# Patient Record
Sex: FEMALE | Race: BLACK OR AFRICAN AMERICAN | NOT HISPANIC OR LATINO | URBAN - METROPOLITAN AREA
[De-identification: names, ages, dates, MRNs, and addresses within clinical notes are randomized per-mention and may not be internally consistent; named-entity substitution may affect disease eponyms.]

---

## 2019-09-03 ENCOUNTER — INPATIENT (INPATIENT)
Facility: HOSPITAL | Age: 34
LOS: 1 days | Discharge: HOME | DRG: 812 | End: 2019-09-05
Attending: INTERNAL MEDICINE | Admitting: INTERNAL MEDICINE
Payer: COMMERCIAL

## 2019-09-03 VITALS — WEIGHT: 210.1 LBS | HEIGHT: 69 IN

## 2019-09-03 DIAGNOSIS — Z29.9 ENCOUNTER FOR PROPHYLACTIC MEASURES, UNSPECIFIED: ICD-10-CM

## 2019-09-03 DIAGNOSIS — E80.6 OTHER DISORDERS OF BILIRUBIN METABOLISM: ICD-10-CM

## 2019-09-03 DIAGNOSIS — D57.00 HB-SS DISEASE WITH CRISIS, UNSPECIFIED: ICD-10-CM

## 2019-09-03 DIAGNOSIS — D57.1 SICKLE-CELL DISEASE WITHOUT CRISIS: ICD-10-CM

## 2019-09-03 DIAGNOSIS — D64.9 ANEMIA, UNSPECIFIED: ICD-10-CM

## 2019-09-03 LAB
ALBUMIN SERPL ELPH-MCNC: 4.1 G/DL — SIGNIFICANT CHANGE UP (ref 3.5–5)
ALP SERPL-CCNC: 122 U/L — HIGH (ref 40–120)
ALT FLD-CCNC: 13 U/L DA — SIGNIFICANT CHANGE UP (ref 10–60)
ANION GAP SERPL CALC-SCNC: 11 MMOL/L — SIGNIFICANT CHANGE UP (ref 5–17)
AST SERPL-CCNC: 26 U/L — SIGNIFICANT CHANGE UP (ref 10–40)
BASOPHILS # BLD AUTO: 0.07 K/UL — SIGNIFICANT CHANGE UP (ref 0–0.2)
BASOPHILS NFR BLD AUTO: 0.4 % — SIGNIFICANT CHANGE UP (ref 0–2)
BILIRUB SERPL-MCNC: 1.9 MG/DL — HIGH (ref 0.2–1.2)
BUN SERPL-MCNC: 4 MG/DL — LOW (ref 7–18)
CALCIUM SERPL-MCNC: 9.2 MG/DL — SIGNIFICANT CHANGE UP (ref 8.4–10.5)
CHLORIDE SERPL-SCNC: 105 MMOL/L — SIGNIFICANT CHANGE UP (ref 96–108)
CO2 SERPL-SCNC: 23 MMOL/L — SIGNIFICANT CHANGE UP (ref 22–31)
CREAT SERPL-MCNC: 0.77 MG/DL — SIGNIFICANT CHANGE UP (ref 0.5–1.3)
EOSINOPHIL # BLD AUTO: 0.02 K/UL — SIGNIFICANT CHANGE UP (ref 0–0.5)
EOSINOPHIL NFR BLD AUTO: 0.1 % — SIGNIFICANT CHANGE UP (ref 0–6)
GLUCOSE SERPL-MCNC: 102 MG/DL — HIGH (ref 70–99)
HCG SERPL-ACNC: <1 MIU/ML — SIGNIFICANT CHANGE UP
HCT VFR BLD CALC: 29.3 % — LOW (ref 34.5–45)
HGB BLD-MCNC: 10.1 G/DL — LOW (ref 11.5–15.5)
IMM GRANULOCYTES NFR BLD AUTO: 0.8 % — SIGNIFICANT CHANGE UP (ref 0–1.5)
LDH SERPL L TO P-CCNC: 339 U/L — HIGH (ref 120–225)
LYMPHOCYTES # BLD AUTO: 28.1 % — SIGNIFICANT CHANGE UP (ref 13–44)
LYMPHOCYTES # BLD AUTO: 4.46 K/UL — HIGH (ref 1–3.3)
MAGNESIUM SERPL-MCNC: 2.1 MG/DL — SIGNIFICANT CHANGE UP (ref 1.6–2.6)
MCHC RBC-ENTMCNC: 30.5 PG — SIGNIFICANT CHANGE UP (ref 27–34)
MCHC RBC-ENTMCNC: 34.5 GM/DL — SIGNIFICANT CHANGE UP (ref 32–36)
MCV RBC AUTO: 88.5 FL — SIGNIFICANT CHANGE UP (ref 80–100)
MONOCYTES # BLD AUTO: 0.68 K/UL — SIGNIFICANT CHANGE UP (ref 0–0.9)
MONOCYTES NFR BLD AUTO: 4.3 % — SIGNIFICANT CHANGE UP (ref 2–14)
NEUTROPHILS # BLD AUTO: 10.51 K/UL — HIGH (ref 1.8–7.4)
NEUTROPHILS NFR BLD AUTO: 66.3 % — SIGNIFICANT CHANGE UP (ref 43–77)
NRBC # BLD: 0 /100 WBCS — SIGNIFICANT CHANGE UP (ref 0–0)
PLATELET # BLD AUTO: 541 K/UL — HIGH (ref 150–400)
POTASSIUM SERPL-MCNC: 3.9 MMOL/L — SIGNIFICANT CHANGE UP (ref 3.5–5.3)
POTASSIUM SERPL-SCNC: 3.9 MMOL/L — SIGNIFICANT CHANGE UP (ref 3.5–5.3)
PROT SERPL-MCNC: 9.2 G/DL — HIGH (ref 6–8.3)
RBC # BLD: 3.31 M/UL — LOW (ref 3.8–5.2)
RBC # BLD: 3.31 M/UL — LOW (ref 3.8–5.2)
RBC # FLD: 17 % — HIGH (ref 10.3–14.5)
RETICS #: 328 K/UL — HIGH (ref 25–125)
RETICS/RBC NFR: 9.9 % — HIGH (ref 0.5–2.5)
SODIUM SERPL-SCNC: 139 MMOL/L — SIGNIFICANT CHANGE UP (ref 135–145)
WBC # BLD: 15.87 K/UL — HIGH (ref 3.8–10.5)
WBC # FLD AUTO: 15.87 K/UL — HIGH (ref 3.8–10.5)

## 2019-09-03 PROCEDURE — 99285 EMERGENCY DEPT VISIT HI MDM: CPT

## 2019-09-03 PROCEDURE — 71046 X-RAY EXAM CHEST 2 VIEWS: CPT | Mod: 26

## 2019-09-03 PROCEDURE — 99223 1ST HOSP IP/OBS HIGH 75: CPT | Mod: AI,GC

## 2019-09-03 RX ORDER — ONDANSETRON 8 MG/1
4 TABLET, FILM COATED ORAL EVERY 6 HOURS
Refills: 0 | Status: DISCONTINUED | OUTPATIENT
Start: 2019-09-03 | End: 2019-09-05

## 2019-09-03 RX ORDER — KETOROLAC TROMETHAMINE 30 MG/ML
30 SYRINGE (ML) INJECTION ONCE
Refills: 0 | Status: DISCONTINUED | OUTPATIENT
Start: 2019-09-03 | End: 2019-09-03

## 2019-09-03 RX ORDER — HYDROMORPHONE HYDROCHLORIDE 2 MG/ML
1 INJECTION INTRAMUSCULAR; INTRAVENOUS; SUBCUTANEOUS ONCE
Refills: 0 | Status: DISCONTINUED | OUTPATIENT
Start: 2019-09-03 | End: 2019-09-03

## 2019-09-03 RX ORDER — INFLUENZA VIRUS VACCINE 15; 15; 15; 15 UG/.5ML; UG/.5ML; UG/.5ML; UG/.5ML
0.5 SUSPENSION INTRAMUSCULAR ONCE
Refills: 0 | Status: COMPLETED | OUTPATIENT
Start: 2019-09-03 | End: 2019-09-05

## 2019-09-03 RX ORDER — ACETAMINOPHEN 500 MG
1000 TABLET ORAL ONCE
Refills: 0 | Status: COMPLETED | OUTPATIENT
Start: 2019-09-04 | End: 2019-09-04

## 2019-09-03 RX ORDER — ACETAMINOPHEN 500 MG
1000 TABLET ORAL ONCE
Refills: 0 | Status: COMPLETED | OUTPATIENT
Start: 2019-09-03 | End: 2019-09-03

## 2019-09-03 RX ORDER — KETOROLAC TROMETHAMINE 30 MG/ML
30 SYRINGE (ML) INJECTION EVERY 6 HOURS
Refills: 0 | Status: DISCONTINUED | OUTPATIENT
Start: 2019-09-03 | End: 2019-09-05

## 2019-09-03 RX ORDER — SODIUM CHLORIDE 9 MG/ML
1000 INJECTION INTRAMUSCULAR; INTRAVENOUS; SUBCUTANEOUS ONCE
Refills: 0 | Status: COMPLETED | OUTPATIENT
Start: 2019-09-03 | End: 2019-09-03

## 2019-09-03 RX ORDER — DEXTROSE MONOHYDRATE, SODIUM CHLORIDE, AND POTASSIUM CHLORIDE 50; .745; 4.5 G/1000ML; G/1000ML; G/1000ML
1000 INJECTION, SOLUTION INTRAVENOUS
Refills: 0 | Status: DISCONTINUED | OUTPATIENT
Start: 2019-09-03 | End: 2019-09-05

## 2019-09-03 RX ORDER — ENOXAPARIN SODIUM 100 MG/ML
40 INJECTION SUBCUTANEOUS DAILY
Refills: 0 | Status: DISCONTINUED | OUTPATIENT
Start: 2019-09-03 | End: 2019-09-05

## 2019-09-03 RX ORDER — HYDROMORPHONE HYDROCHLORIDE 2 MG/ML
1 INJECTION INTRAMUSCULAR; INTRAVENOUS; SUBCUTANEOUS
Refills: 0 | Status: DISCONTINUED | OUTPATIENT
Start: 2019-09-03 | End: 2019-09-04

## 2019-09-03 RX ORDER — KETOROLAC TROMETHAMINE 30 MG/ML
30 SYRINGE (ML) INJECTION EVERY 6 HOURS
Refills: 0 | Status: DISCONTINUED | OUTPATIENT
Start: 2019-09-03 | End: 2019-09-03

## 2019-09-03 RX ORDER — HYDROMORPHONE HYDROCHLORIDE 2 MG/ML
2 INJECTION INTRAMUSCULAR; INTRAVENOUS; SUBCUTANEOUS EVERY 4 HOURS
Refills: 0 | Status: DISCONTINUED | OUTPATIENT
Start: 2019-09-03 | End: 2019-09-03

## 2019-09-03 RX ADMIN — HYDROMORPHONE HYDROCHLORIDE 1 MILLIGRAM(S): 2 INJECTION INTRAMUSCULAR; INTRAVENOUS; SUBCUTANEOUS at 09:55

## 2019-09-03 RX ADMIN — HYDROMORPHONE HYDROCHLORIDE 1 MILLIGRAM(S): 2 INJECTION INTRAMUSCULAR; INTRAVENOUS; SUBCUTANEOUS at 09:25

## 2019-09-03 RX ADMIN — SODIUM CHLORIDE 1000 MILLILITER(S): 9 INJECTION INTRAMUSCULAR; INTRAVENOUS; SUBCUTANEOUS at 10:50

## 2019-09-03 RX ADMIN — HYDROMORPHONE HYDROCHLORIDE 1 MILLIGRAM(S): 2 INJECTION INTRAMUSCULAR; INTRAVENOUS; SUBCUTANEOUS at 11:50

## 2019-09-03 RX ADMIN — HYDROMORPHONE HYDROCHLORIDE 1 MILLIGRAM(S): 2 INJECTION INTRAMUSCULAR; INTRAVENOUS; SUBCUTANEOUS at 20:18

## 2019-09-03 RX ADMIN — Medication 400 MILLIGRAM(S): at 19:00

## 2019-09-03 RX ADMIN — Medication 1000 MILLIGRAM(S): at 22:48

## 2019-09-03 RX ADMIN — DEXTROSE MONOHYDRATE, SODIUM CHLORIDE, AND POTASSIUM CHLORIDE 100 MILLILITER(S): 50; .745; 4.5 INJECTION, SOLUTION INTRAVENOUS at 19:00

## 2019-09-03 RX ADMIN — Medication 400 MILLIGRAM(S): at 22:33

## 2019-09-03 RX ADMIN — ONDANSETRON 4 MILLIGRAM(S): 8 TABLET, FILM COATED ORAL at 16:28

## 2019-09-03 RX ADMIN — Medication 30 MILLIGRAM(S): at 23:52

## 2019-09-03 RX ADMIN — Medication 30 MILLIGRAM(S): at 16:10

## 2019-09-03 RX ADMIN — ONDANSETRON 4 MILLIGRAM(S): 8 TABLET, FILM COATED ORAL at 22:36

## 2019-09-03 RX ADMIN — HYDROMORPHONE HYDROCHLORIDE 1 MILLIGRAM(S): 2 INJECTION INTRAMUSCULAR; INTRAVENOUS; SUBCUTANEOUS at 20:33

## 2019-09-03 RX ADMIN — Medication 30 MILLIGRAM(S): at 15:01

## 2019-09-03 RX ADMIN — Medication 400 MILLIGRAM(S): at 16:27

## 2019-09-03 RX ADMIN — SODIUM CHLORIDE 1000 MILLILITER(S): 9 INJECTION INTRAMUSCULAR; INTRAVENOUS; SUBCUTANEOUS at 09:50

## 2019-09-03 RX ADMIN — HYDROMORPHONE HYDROCHLORIDE 1 MILLIGRAM(S): 2 INJECTION INTRAMUSCULAR; INTRAVENOUS; SUBCUTANEOUS at 09:50

## 2019-09-03 RX ADMIN — SODIUM CHLORIDE 1000 MILLILITER(S): 9 INJECTION INTRAMUSCULAR; INTRAVENOUS; SUBCUTANEOUS at 11:30

## 2019-09-03 RX ADMIN — HYDROMORPHONE HYDROCHLORIDE 1 MILLIGRAM(S): 2 INJECTION INTRAMUSCULAR; INTRAVENOUS; SUBCUTANEOUS at 11:30

## 2019-09-03 RX ADMIN — Medication 30 MILLIGRAM(S): at 17:47

## 2019-09-03 RX ADMIN — Medication 1000 MILLIGRAM(S): at 19:47

## 2019-09-03 RX ADMIN — HYDROMORPHONE HYDROCHLORIDE 1 MILLIGRAM(S): 2 INJECTION INTRAMUSCULAR; INTRAVENOUS; SUBCUTANEOUS at 10:15

## 2019-09-03 NOTE — H&P ADULT - HISTORY OF PRESENT ILLNESS
33 y/o female with PMHx of sickle cell disease presents to the ED with intractable upper extremity and back pain x1 day. Patient reports waking up around 1am this morning in severe pain and says she knew it was a pain crisis immediately. She says the pain is 10/10 in severity and felt most in her upper extremities b/l and her back. She also endorses nausea. She denies any fever, cough, polyuria or any other symptoms at this time. Patient reports she moved to [place] from [place] in July 2018 and has not found a new hematologist to follow yet. She says this is why she is not on any medications at home (was previously taking iron and folic acid). She has no other complaints at this time.    ED course: s/p 1L NS bolus x2. s/p Dilaudid 1mg IV push x3 with minimal improvement in pain.

## 2019-09-03 NOTE — H&P ADULT - PROBLEM SELECTOR PLAN 5
IMPROVE VTE Individual Risk Assessment          RISK                                                          Points  [  ] Previous VTE                                                3  [  ] Thrombophilia                                             2  [  ] Lower limb paralysis                                   2        (unable to hold up >15 seconds)    [  ] Current Cancer                                             2         (within 6 months)  [ X ] Immobilization > 24 hrs                              1  [  ] ICU/CCU stay > 24 hours                             1  [  ] Age > 60                                                         1    IMPROVE VTE Score: 1    lovenox subq

## 2019-09-03 NOTE — H&P ADULT - NSHPPHYSICALEXAM_GEN_ALL_CORE
Vital Signs Last 24 Hrs  T(C): 36.7 (03 Sep 2019 15:00), Max: 36.9 (03 Sep 2019 09:27)  T(F): 98 (03 Sep 2019 15:00), Max: 98.5 (03 Sep 2019 09:27)  HR: 95 (03 Sep 2019 15:00) (95 - 98)  BP: 110/75 (03 Sep 2019 15:00) (107/76 - 110/75)  BP(mean): --  RR: 20 (03 Sep 2019 15:00) (20 - 20)  SpO2: 100% (03 Sep 2019 15:00) (100% - 100%)

## 2019-09-03 NOTE — H&P ADULT - PROBLEM SELECTOR PLAN 1
p/w severe pain in upper extremities and back x1 day  Hb 10.1 on admission with abs retic ct of 328    WBC count 15.81  pt not on any medications at home for SCD and has no hematologist at present  starting dilaudid 1mg q3 prn  starting toradol 30mg IV push q6 standing as per pain mgt  zofran prn for nausea  pain management following  heme/onc consult p/w severe pain in upper extremities and back x1 day  Hb 10.1 on admission with abs retic ct of 328    WBC count 15.81  pt not on any medications at home for SCD and has no hematologist at present  starting dilaudid 1mg q3 prn  starting toradol 30mg IV push q6 standing as per pain mgt  zofran prn for nausea  pain management following  heme/onc consult  f/u Hb electrophoresis

## 2019-09-03 NOTE — H&P ADULT - PROBLEM SELECTOR PLAN 3
IMPROVE VTE Individual Risk Assessment          RISK                                                          Points  [  ] Previous VTE                                                3  [  ] Thrombophilia                                             2  [  ] Lower limb paralysis                                   2        (unable to hold up >15 seconds)    [  ] Current Cancer                                             2         (within 6 months)  [ X ] Immobilization > 24 hrs                              1  [  ] ICU/CCU stay > 24 hours                             1  [  ] Age > 60                                                         1    IMPROVE VTE Score: 1    lovenox subq tbili on admission 1.9  pt reports known history of gallstones  denies abdominal pain at present  f/u US abdomen  f/u CMP daily tbili on admission 1.9  pt reports known history of gallstones  denies abdominal pain at present  likely 2/2 to hemolysis  f/u CMP daily

## 2019-09-03 NOTE — ED PROVIDER NOTE - PROGRESS NOTE DETAILS
pt reassessed, pain improved though not resolved.  CXR negative.  H/h noted.  elevated retic count.  Pt currently receiving 2nd liter of fluids.  Will continue to observe. pt again reassessed.  Still not feeling well.  pt has received 3 doses of parenteral narcotics and pain continues.  Will admit for pain control.

## 2019-09-03 NOTE — H&P ADULT - PROBLEM SELECTOR PLAN 2
management as above  pain control prn  will need outpatient heme/onc referral for continued care on discharge

## 2019-09-03 NOTE — H&P ADULT - ATTENDING COMMENTS
Patient was examined and discussed with Dr. Nolasco.   She has a history of sickle cell disease with infrequent crises.  [place] and has not yet established care here.  Today she has c/o of pain radiating from both elbows to her neck (from her neck to her arms and both elbows??)    allergies:  erythromycin    Alert cooperative woman with intermittent severe pain  Vital Signs Last 24 Hrs  T(C): 36.3 (03 Sep 2019 18:35), Max: 36.9 (03 Sep 2019 09:27)  T(F): 97.3 (03 Sep 2019 18:35), Max: 98.5 (03 Sep 2019 09:27)  HR: 69 (03 Sep 2019 18:35) (69 - 98)  BP: 143/70 (03 Sep 2019 18:35) (107/76 - 143/70)  BP(mean): --  RR: 18 (03 Sep 2019 18:35) (18 - 20)  SpO2: 100% (03 Sep 2019 18:35) (100% - 100%)  No point tenderness on the neck  Lungs, clear  Cor, RRR  abdomen, soft  Neurological, intact                        10.1   15.87 )-----------( 541      ( 03 Sep 2019 09:36 )             29.3   09-03    139  |  105  |  4<L>  ----------------------------<  102<H>  3.9   |  23  |  0.77    Ca    9.2      03 Sep 2019 09:36  Mg     2.1     09-03    TPro  9.2<H>  /  Alb  4.1  /  TBili  1.9<H>  /  DBili  x   /  AST  26  /  ALT  13  /  AlkPhos  122<H>  09-03  Reticulocyte Percent: 9.9 % (09.03.19 @ 09:36)    IMP:  Sickle cell pain crisis  Plan:  IV hydration, analgesia:  IV Tylenol x 1-2.  Ketorolac every six hours; dilaudid PRN.   Hemoglobin electrophoresis in AM.  Hematology consulation, Dr. Ribeiro will see tomorrow.

## 2019-09-03 NOTE — ED PROVIDER NOTE - OBJECTIVE STATEMENT
35 y/o F patient with a significant PMHx of Sickle Cell anemia and no significant PSHx presents to the ED with elbow and back pain. Patient says her last sickle cell crisis was x5 years ago with her presenting symptoms feeling typical of her past crisis. Patient denies chest pain, SOB, nausea, vomiting, and any other complaints. NKDA.

## 2019-09-03 NOTE — ED ADULT NURSE NOTE - OBJECTIVE STATEMENT
Pt AOx4, ambulatory, c/o sickle cell crisis, BL shoulder and arms pain. 10/10. pt denies n/v/f., CP, SOB. Pt medicated for pain as per MD Tate orders.

## 2019-09-03 NOTE — H&P ADULT - PROBLEM SELECTOR PLAN 4
IMPROVE VTE Individual Risk Assessment          RISK                                                          Points  [  ] Previous VTE                                                3  [  ] Thrombophilia                                             2  [  ] Lower limb paralysis                                   2        (unable to hold up >15 seconds)    [  ] Current Cancer                                             2         (within 6 months)  [ X ] Immobilization > 24 hrs                              1  [  ] ICU/CCU stay > 24 hours                             1  [  ] Age > 60                                                         1    IMPROVE VTE Score: 1    lovenox subq likely 2/2 to SCD  baseline unknown  f/u CBC daily  f/u iron studies  f/u haptoglobin

## 2019-09-03 NOTE — H&P ADULT - NSICDXFAMILYHX_GEN_ALL_CORE_FT
No pertinent family history in first degree relatives FAMILY HISTORY:  Family history of sickle cell disease

## 2019-09-04 VITALS
TEMPERATURE: 98 F | SYSTOLIC BLOOD PRESSURE: 154 MMHG | OXYGEN SATURATION: 100 % | DIASTOLIC BLOOD PRESSURE: 55 MMHG | RESPIRATION RATE: 15 BRPM | HEART RATE: 72 BPM

## 2019-09-04 DIAGNOSIS — D57.00 HB-SS DISEASE WITH CRISIS, UNSPECIFIED: ICD-10-CM

## 2019-09-04 LAB
ALBUMIN SERPL ELPH-MCNC: 3.5 G/DL — SIGNIFICANT CHANGE UP (ref 3.5–5)
ALP SERPL-CCNC: 104 U/L — SIGNIFICANT CHANGE UP (ref 40–120)
ALT FLD-CCNC: 14 U/L DA — SIGNIFICANT CHANGE UP (ref 10–60)
AMPHET UR-MCNC: NEGATIVE — SIGNIFICANT CHANGE UP
ANION GAP SERPL CALC-SCNC: 5 MMOL/L — SIGNIFICANT CHANGE UP (ref 5–17)
AST SERPL-CCNC: 18 U/L — SIGNIFICANT CHANGE UP (ref 10–40)
BARBITURATES UR SCN-MCNC: NEGATIVE — SIGNIFICANT CHANGE UP
BASOPHILS # BLD AUTO: 0.05 K/UL — SIGNIFICANT CHANGE UP (ref 0–0.2)
BASOPHILS NFR BLD AUTO: 0.4 % — SIGNIFICANT CHANGE UP (ref 0–2)
BENZODIAZ UR-MCNC: NEGATIVE — SIGNIFICANT CHANGE UP
BILIRUB SERPL-MCNC: 1.4 MG/DL — HIGH (ref 0.2–1.2)
BUN SERPL-MCNC: 4 MG/DL — LOW (ref 7–18)
CALCIUM SERPL-MCNC: 8.6 MG/DL — SIGNIFICANT CHANGE UP (ref 8.4–10.5)
CHLORIDE SERPL-SCNC: 107 MMOL/L — SIGNIFICANT CHANGE UP (ref 96–108)
CHOLEST SERPL-MCNC: 138 MG/DL — SIGNIFICANT CHANGE UP (ref 10–199)
CO2 SERPL-SCNC: 29 MMOL/L — SIGNIFICANT CHANGE UP (ref 22–31)
COCAINE METAB.OTHER UR-MCNC: NEGATIVE — SIGNIFICANT CHANGE UP
CREAT SERPL-MCNC: 0.66 MG/DL — SIGNIFICANT CHANGE UP (ref 0.5–1.3)
EOSINOPHIL # BLD AUTO: 0.19 K/UL — SIGNIFICANT CHANGE UP (ref 0–0.5)
EOSINOPHIL NFR BLD AUTO: 1.4 % — SIGNIFICANT CHANGE UP (ref 0–6)
ETHANOL SERPL-MCNC: <3 MG/DL — SIGNIFICANT CHANGE UP (ref 0–10)
FERRITIN SERPL-MCNC: 322 NG/ML — HIGH (ref 15–150)
FOLATE SERPL-MCNC: 9.2 NG/ML — SIGNIFICANT CHANGE UP
GLUCOSE SERPL-MCNC: 95 MG/DL — SIGNIFICANT CHANGE UP (ref 70–99)
HBA1C BLD-MCNC: <4 % — LOW (ref 4–5.6)
HCT VFR BLD CALC: 26.1 % — LOW (ref 34.5–45)
HDLC SERPL-MCNC: 43 MG/DL — LOW
HGB BLD-MCNC: 8.8 G/DL — LOW (ref 11.5–15.5)
IMM GRANULOCYTES NFR BLD AUTO: 0.8 % — SIGNIFICANT CHANGE UP (ref 0–1.5)
IRON SATN MFR SERPL: 16 % — SIGNIFICANT CHANGE UP (ref 15–50)
IRON SATN MFR SERPL: 39 UG/DL — LOW (ref 40–150)
LIPID PNL WITH DIRECT LDL SERPL: 77 MG/DL — SIGNIFICANT CHANGE UP
LYMPHOCYTES # BLD AUTO: 39.5 % — SIGNIFICANT CHANGE UP (ref 13–44)
LYMPHOCYTES # BLD AUTO: 5.24 K/UL — HIGH (ref 1–3.3)
MAGNESIUM SERPL-MCNC: 2.2 MG/DL — SIGNIFICANT CHANGE UP (ref 1.6–2.6)
MCHC RBC-ENTMCNC: 30.2 PG — SIGNIFICANT CHANGE UP (ref 27–34)
MCHC RBC-ENTMCNC: 33.7 GM/DL — SIGNIFICANT CHANGE UP (ref 32–36)
MCV RBC AUTO: 89.7 FL — SIGNIFICANT CHANGE UP (ref 80–100)
METHADONE UR-MCNC: NEGATIVE — SIGNIFICANT CHANGE UP
MONOCYTES # BLD AUTO: 1.05 K/UL — HIGH (ref 0–0.9)
MONOCYTES NFR BLD AUTO: 7.9 % — SIGNIFICANT CHANGE UP (ref 2–14)
NEUTROPHILS # BLD AUTO: 6.63 K/UL — SIGNIFICANT CHANGE UP (ref 1.8–7.4)
NEUTROPHILS NFR BLD AUTO: 50 % — SIGNIFICANT CHANGE UP (ref 43–77)
NRBC # BLD: 0 /100 WBCS — SIGNIFICANT CHANGE UP (ref 0–0)
OPIATES UR-MCNC: NEGATIVE — SIGNIFICANT CHANGE UP
PCP SPEC-MCNC: SIGNIFICANT CHANGE UP
PCP UR-MCNC: NEGATIVE — SIGNIFICANT CHANGE UP
PHOSPHATE SERPL-MCNC: 4.1 MG/DL — SIGNIFICANT CHANGE UP (ref 2.5–4.5)
PLATELET # BLD AUTO: 458 K/UL — HIGH (ref 150–400)
POTASSIUM SERPL-MCNC: 4.1 MMOL/L — SIGNIFICANT CHANGE UP (ref 3.5–5.3)
POTASSIUM SERPL-SCNC: 4.1 MMOL/L — SIGNIFICANT CHANGE UP (ref 3.5–5.3)
PROT SERPL-MCNC: 7.7 G/DL — SIGNIFICANT CHANGE UP (ref 6–8.3)
RBC # BLD: 2.91 M/UL — LOW (ref 3.8–5.2)
RBC # FLD: 17 % — HIGH (ref 10.3–14.5)
SODIUM SERPL-SCNC: 141 MMOL/L — SIGNIFICANT CHANGE UP (ref 135–145)
THC UR QL: NEGATIVE — SIGNIFICANT CHANGE UP
TIBC SERPL-MCNC: 251 UG/DL — SIGNIFICANT CHANGE UP (ref 250–450)
TOTAL CHOLESTEROL/HDL RATIO MEASUREMENT: 3.2 RATIO — LOW (ref 3.3–7.1)
TRANSFERRIN SERPL-MCNC: 193 MG/DL — LOW (ref 200–360)
TRIGL SERPL-MCNC: 91 MG/DL — SIGNIFICANT CHANGE UP (ref 10–149)
TSH SERPL-MCNC: 4.47 UU/ML — SIGNIFICANT CHANGE UP (ref 0.34–4.82)
UIBC SERPL-MCNC: 212 UG/DL — SIGNIFICANT CHANGE UP (ref 110–370)
VIT B12 SERPL-MCNC: 457 PG/ML — SIGNIFICANT CHANGE UP (ref 232–1245)
WBC # BLD: 13.26 K/UL — HIGH (ref 3.8–10.5)
WBC # FLD AUTO: 13.26 K/UL — HIGH (ref 3.8–10.5)

## 2019-09-04 PROCEDURE — 99233 SBSQ HOSP IP/OBS HIGH 50: CPT | Mod: GC

## 2019-09-04 PROCEDURE — 83020 HEMOGLOBIN ELECTROPHORESIS: CPT | Mod: 26,59

## 2019-09-04 PROCEDURE — 99223 1ST HOSP IP/OBS HIGH 75: CPT

## 2019-09-04 RX ORDER — ACETAMINOPHEN 500 MG
1000 TABLET ORAL ONCE
Refills: 0 | Status: COMPLETED | OUTPATIENT
Start: 2019-09-04 | End: 2019-09-04

## 2019-09-04 RX ORDER — CYCLOBENZAPRINE HYDROCHLORIDE 10 MG/1
10 TABLET, FILM COATED ORAL THREE TIMES A DAY
Refills: 0 | Status: DISCONTINUED | OUTPATIENT
Start: 2019-09-04 | End: 2019-09-04

## 2019-09-04 RX ORDER — CYCLOBENZAPRINE HYDROCHLORIDE 10 MG/1
10 TABLET, FILM COATED ORAL THREE TIMES A DAY
Refills: 0 | Status: DISCONTINUED | OUTPATIENT
Start: 2019-09-04 | End: 2019-09-05

## 2019-09-04 RX ORDER — ACETAMINOPHEN 500 MG
1000 TABLET ORAL ONCE
Refills: 0 | Status: COMPLETED | OUTPATIENT
Start: 2019-09-05 | End: 2019-09-05

## 2019-09-04 RX ORDER — FOLIC ACID 0.8 MG
1 TABLET ORAL DAILY
Refills: 0 | Status: DISCONTINUED | OUTPATIENT
Start: 2019-09-04 | End: 2019-09-05

## 2019-09-04 RX ORDER — METOCLOPRAMIDE HCL 10 MG
10 TABLET ORAL EVERY 6 HOURS
Refills: 0 | Status: DISCONTINUED | OUTPATIENT
Start: 2019-09-04 | End: 2019-09-05

## 2019-09-04 RX ORDER — SENNA PLUS 8.6 MG/1
2 TABLET ORAL AT BEDTIME
Refills: 0 | Status: DISCONTINUED | OUTPATIENT
Start: 2019-09-04 | End: 2019-09-05

## 2019-09-04 RX ADMIN — ONDANSETRON 4 MILLIGRAM(S): 8 TABLET, FILM COATED ORAL at 06:17

## 2019-09-04 RX ADMIN — CYCLOBENZAPRINE HYDROCHLORIDE 10 MILLIGRAM(S): 10 TABLET, FILM COATED ORAL at 16:25

## 2019-09-04 RX ADMIN — Medication 30 MILLIGRAM(S): at 16:32

## 2019-09-04 RX ADMIN — Medication 30 MILLIGRAM(S): at 22:31

## 2019-09-04 RX ADMIN — Medication 1000 MILLIGRAM(S): at 22:45

## 2019-09-04 RX ADMIN — Medication 400 MILLIGRAM(S): at 06:16

## 2019-09-04 RX ADMIN — CYCLOBENZAPRINE HYDROCHLORIDE 10 MILLIGRAM(S): 10 TABLET, FILM COATED ORAL at 22:27

## 2019-09-04 RX ADMIN — HYDROMORPHONE HYDROCHLORIDE 1 MILLIGRAM(S): 2 INJECTION INTRAMUSCULAR; INTRAVENOUS; SUBCUTANEOUS at 03:51

## 2019-09-04 RX ADMIN — Medication 1000 MILLIGRAM(S): at 15:00

## 2019-09-04 RX ADMIN — Medication 400 MILLIGRAM(S): at 13:27

## 2019-09-04 RX ADMIN — HYDROMORPHONE HYDROCHLORIDE 1 MILLIGRAM(S): 2 INJECTION INTRAMUSCULAR; INTRAVENOUS; SUBCUTANEOUS at 03:36

## 2019-09-04 RX ADMIN — Medication 30 MILLIGRAM(S): at 18:07

## 2019-09-04 RX ADMIN — DEXTROSE MONOHYDRATE, SODIUM CHLORIDE, AND POTASSIUM CHLORIDE 100 MILLILITER(S): 50; .745; 4.5 INJECTION, SOLUTION INTRAVENOUS at 06:16

## 2019-09-04 RX ADMIN — Medication 104 MILLIGRAM(S): at 19:53

## 2019-09-04 RX ADMIN — Medication 1000 MILLIGRAM(S): at 06:33

## 2019-09-04 RX ADMIN — Medication 30 MILLIGRAM(S): at 22:45

## 2019-09-04 RX ADMIN — Medication 1 MILLIGRAM(S): at 12:25

## 2019-09-04 RX ADMIN — DEXTROSE MONOHYDRATE, SODIUM CHLORIDE, AND POTASSIUM CHLORIDE 100 MILLILITER(S): 50; .745; 4.5 INJECTION, SOLUTION INTRAVENOUS at 16:45

## 2019-09-04 RX ADMIN — Medication 30 MILLIGRAM(S): at 13:32

## 2019-09-04 RX ADMIN — Medication 400 MILLIGRAM(S): at 22:27

## 2019-09-04 RX ADMIN — Medication 30 MILLIGRAM(S): at 00:07

## 2019-09-04 RX ADMIN — Medication 30 MILLIGRAM(S): at 06:16

## 2019-09-04 RX ADMIN — Medication 30 MILLIGRAM(S): at 12:19

## 2019-09-04 RX ADMIN — Medication 30 MILLIGRAM(S): at 06:33

## 2019-09-04 NOTE — PROGRESS NOTE ADULT - PROBLEM SELECTOR PLAN 2
management as above  pain control prn  will need outpatient heme/onc referral for continued care on discharge management as above  pain control prn  will arrange outpatient heme/onc referral for continued care on discharge

## 2019-09-04 NOTE — PROGRESS NOTE ADULT - PROBLEM SELECTOR PLAN 3
tbili on admission 1.9  pt reports known history of gallstones  denies abdominal pain at present  likely 2/2 to hemolysis  f/u CMP daily tbili on admission 1.9  pt reports known history of gallstones  denies abdominal pain at present  likely 2/2 to hemolysis

## 2019-09-04 NOTE — CONSULT NOTE ADULT - SUBJECTIVE AND OBJECTIVE BOX
Patient is a 34y old  Female who presents with a chief complaint of sickle cell pain crisis (03 Sep 2019 15:07)      HPI:  35 y/o female with PMHx of sickle cell disease presents to the ED with intractable upper extremity and back pain x1 day. Patient reports waking up around 1am this morning in severe pain and says she knew it was a pain crisis immediately. She says the pain is 10/10 in severity and felt most in her upper extremities b/l and her back. She also endorses nausea. She denies any fever, cough, polyuria or any other symptoms at this time. Patient reports she moved to [place] from [place] in July 2018 and has not found a new hematologist to follow yet. She says this is why she is not on any medications at home (was previously taking iron and folic acid). She has no other complaints at this time. She reports two previous admissions for crisis in 2011 and 2002 in part related to her high HbF level. Was never prescribed Hydrea. Transfused twice during her two pregnancies. Never intubated. Took penicillin as a child for prophyslaxis. Dilaudid does not agree with her. Responds better to iv acetominophen.    ED course: s/p 1L NS bolus x2. s/p Dilaudid 1mg IV push x3 with minimal improvement in pain. (03 Sep 2019 15:07)       ROS:  Negative except for:    PAST MEDICAL & SURGICAL HISTORY:  Sickle cell disease  No significant past surgical history      SOCIAL HISTORY:    FAMILY HISTORY:  Family history of sickle cell disease      MEDICATIONS  (STANDING):  dextrose 5% + sodium chloride 0.45% with potassium chloride 20 mEq/L 1000 milliLiter(s) (100 mL/Hr) IV Continuous <Continuous>  enoxaparin Injectable 40 milliGRAM(s) SubCutaneous daily  influenza   Vaccine 0.5 milliLiter(s) IntraMuscular once  ketorolac   Injectable 30 milliGRAM(s) IV Push every 6 hours    MEDICATIONS  (PRN):  HYDROmorphone  Injectable 1 milliGRAM(s) IV Push every 3 hours PRN Severe Pain (7 - 10)  ondansetron Injectable 4 milliGRAM(s) IV Push every 6 hours PRN Nausea and/or Vomiting      Allergies    erythromycin (Rash)    Intolerances        Vital Signs Last 24 Hrs  T(C): 36.6 (04 Sep 2019 07:37), Max: 36.9 (03 Sep 2019 09:27)  T(F): 97.9 (04 Sep 2019 07:37), Max: 98.5 (03 Sep 2019 09:27)  HR: 59 (04 Sep 2019 07:37) (50 - 98)  BP: 133/60 (04 Sep 2019 07:37) (107/76 - 143/70)  BP(mean): --  RR: 17 (04 Sep 2019 07:37) (15 - 20)  SpO2: 100% (04 Sep 2019 07:37) (99% - 100%)    PHYSICAL EXAM  General: adult in NAD  HEENT: clear oropharynx, anicteric sclera, pink conjunctiva  Neck: supple  CV: normal S1/S2 with no murmur rubs or gallops  Lungs: positive air movement b/l ant lungs,clear to auscultation, no wheezes, no rales  Abdomen: soft non-tender non-distended, no hepatosplenomegaly  Ext: no clubbing cyanosis or edema  Skin: no rashes and no petechiae  Neuro: alert and oriented X 4, no focal deficits      LABS:                          8.8    13.26 )-----------( 458      ( 04 Sep 2019 07:23 )             26.1         Mean Cell Volume : 89.7 fl  Mean Cell Hemoglobin : 30.2 pg  Mean Cell Hemoglobin Concentration : 33.7 gm/dL  Auto Neutrophil # : 6.63 K/uL  Auto Lymphocyte # : 5.24 K/uL  Auto Monocyte # : 1.05 K/uL  Auto Eosinophil # : 0.19 K/uL  Auto Basophil # : 0.05 K/uL  Auto Neutrophil % : 50.0 %  Auto Lymphocyte % : 39.5 %  Auto Monocyte % : 7.9 %  Auto Eosinophil % : 1.4 %  Auto Basophil % : 0.4 %      Serial CBC's  09-04 @ 07:23  Hct-26.1 / Hgb-8.8 / Plat-458 / RBC-2.91 / WBC-13.26  Serial CBC's  09-03 @ 09:36  Hct-29.3 / Hgb-10.1 / Plat-541 / RBC-3.31 / WBC-15.87      09-04    141  |  107  |  x   ----------------------------<  x   4.1   |  29  |  x     Ca    9.2      03 Sep 2019 09:36  Mg     2.2     09-04    TPro  x   /  Alb  3.5  /  TBili  x   /  DBili  x   /  AST  x   /  ALT  x   /  AlkPhos  x   09-04          Iron - Total Binding Capacity.: 251 ug/dL (09-04 @ 07:23)  Reticulocyte Percent: 9.9 % (09-03 @ 09:36)              BLOOD SMEAR INTERPRETATION:       RADIOLOGY & ADDITIONAL STUDIES:

## 2019-09-04 NOTE — PROGRESS NOTE ADULT - ATTENDING COMMENTS
Patient was examined and discussed with Dr. Chance, Pain Management, and RN at the bedside during an episodic cramping neck pain radiating down her arms.    She has responded well to analgesics, but has early breakthrough.   BP cuff initiated the most recent spasm.   We will continue current plan of IV acetaminophen, ketorolac every six hours and muscle relaxant and try reglan PRN spasm.     Hematology consultation, appreciated.

## 2019-09-04 NOTE — DISCHARGE NOTE PROVIDER - NSDCCPCAREPLAN_GEN_ALL_CORE_FT
PRINCIPAL DISCHARGE DIAGNOSIS  Diagnosis: Sickle cell crisis  Assessment and Plan of Treatment: You came to us with Sickle cell crisis. we gave you fluids and started pain managment. Next day you felt much better. Heamatologist recommended for you to go to Sickle cell clinic in [facility]. We arranged an appointment for you to go there. Please followup wuth your PCP ivan 1 week of discharge. Avoid any triger of your sickle cell crisis.      SECONDARY DISCHARGE DIAGNOSES  Diagnosis: Hyperbilirubinemia  Assessment and Plan of Treatment: You came to us with increased LFT. We monitored your LFTs. You have a history of gallstones too. Please followup with your PCP thiago 1 week of discharge.    Diagnosis: Sickle cell disease  Assessment and Plan of Treatment: You have a history of sickle cell disease. Please followup with sickle cell clinic at [facility].    Diagnosis: Anemia  Assessment and Plan of Treatment: You PRINCIPAL DISCHARGE DIAGNOSIS  Diagnosis: Sickle cell crisis  Assessment and Plan of Treatment: You were diagnosed  with Sickle cell crisis.   You were treated with IV fluids and pain medications .Your pain is much better controlled now  . Your latest Hemoglobin is 9.4 . You didnt required any blood transfusion while in the hospital.   Continue taking the pain medications as prescribed .   Hematologist recommended for you to go to Sickle cell center at [facility]. [facility] will call you regarding setting up an appointment or call at [phone] if you dont hear from them in 2-3 days Please followup with your PCP ivan 1 week of discharge. Avoid any triger of your sickle cell crisis.      SECONDARY DISCHARGE DIAGNOSES  Diagnosis: Hyperbilirubinemia  Assessment and Plan of Treatment: You came to us with increased Liver enzymes . We monitored your LFTs. You have a history of gallstones too. Please followup with your PCP thiago 1 week of discharge.    Diagnosis: Sickle cell disease  Assessment and Plan of Treatment: You have a history of sickle cell disease.   Continue taking the folic acid as prescribed . Please followup with sickle cell clinic at [place].

## 2019-09-04 NOTE — CONSULT NOTE ADULT - SUBJECTIVE AND OBJECTIVE BOX
Chief Complaint:    HPI:  33 y/o female with PMHx of sickle cell disease presents to the ED with intractable upper extremity and back pain x1 day. Patient reports waking up around 1am this morning in severe pain and says she knew it was a pain crisis immediately. She says the pain is 10/10 in severity and felt most in her upper extremities b/l and her back. She also endorses nausea. She denies any fever, cough, polyuria or any other symptoms at this time. Patient reports she moved to [place] from [place] in July 2018 and has not found a new hematologist to follow yet. She says this is why she is not on any medications at home (was previously taking iron and folic acid). She has no other complaints at this time.    ED course: s/p 1L NS bolus x2. s/p Dilaudid 1mg IV push x3 with minimal improvement in pain. (03 Sep 2019 15:07)      Pain Level:   Scale: VAS    PAST MEDICAL & SURGICAL HISTORY:  Sickle cell disease  No significant past surgical history      FAMILY HISTORY:  Family history of sickle cell disease      SOCIAL HISTORY:  [ ] Denies Smoking, Alcohol, or Drug Use    Allergies    erythromycin (Rash)    Intolerances        PAIN MEDICATIONS:  acetaminophen  IVPB .. 1000 milliGRAM(s) IV Intermittent once  cyclobenzaprine 10 milliGRAM(s) Oral three times a day  ketorolac   Injectable 30 milliGRAM(s) IV Push every 6 hours  ondansetron Injectable 4 milliGRAM(s) IV Push every 6 hours PRN      Heme:  enoxaparin Injectable 40 milliGRAM(s) SubCutaneous daily    Antibiotics:    Cardiovascular:    GI:  senna 2 Tablet(s) Oral at bedtime    Endocrine:    All Other Medications:  dextrose 5% + sodium chloride 0.45% with potassium chloride 20 mEq/L 1000 milliLiter(s) IV Continuous <Continuous>  folic acid 1 milliGRAM(s) Oral daily  influenza   Vaccine 0.5 milliLiter(s) IntraMuscular once      REVIEW OF SYSTEMS:    CONSTITUTIONAL: No fever, weight loss, or fatigue  RESPIRATORY: No cough, wheezing, chills, or hemoptysis, No SOB  NECK: No neck pain  CARDIOVASCULAR: No chest pain, palpitations, dizziness, or leg swelling  GASTROINTESTINAL: No abdominal or epigastric pain.  No nausea, vomiting, or hematemesis.  No diarrhea. + constipation.  GENITOURINARY: No dysuria, frequency, hematuria or incontinence  NEUROLOGICAL: No headaches, memory loss, loss of strength, numbness or tremors  MUSCULOSKELETAL: No joint pain or swelling, No muscle or back pain    Vital Signs Last 24 Hrs  T(C): 36.6 (04 Sep 2019 07:37), Max: 36.7 (03 Sep 2019 15:00)  T(F): 97.9 (04 Sep 2019 07:37), Max: 98.1 (04 Sep 2019 00:10)  HR: 59 (04 Sep 2019 07:37) (50 - 95)  BP: 133/60 (04 Sep 2019 07:37) (110/75 - 143/70)  BP(mean): --  RR: 17 (04 Sep 2019 07:37) (15 - 20)  SpO2: 100% (04 Sep 2019 07:37) (99% - 100%)    PAIN SCORE:         SCALE USED: (1-10 VNRS)    PHYSICAL EXAM:    GENERAL: NAD, well-groomed, well-developed   NERVOUS SYSTEM: Alert and oriented x3, Motor strength 5/5 B/L upper and lower extremities, SLR -   CHEST/LUNG: Clear to percussion bilaterally, no rale, rhonchi or wheezing  HEART: Regular rate and rhythm, No murmurs, rubs, or gallops   ABDOMEN: Soft, nontender, nondistended, Bowel sounds present  BACK: No CVA tenderness, No paravetebral tenderness  EXTREMITIES: +2 periperal extremities, no edema, cyanosis + decreased rom     LABS:                          8.8    13.26 )-----------( 458      ( 04 Sep 2019 07:23 )             26.1     09-04    141  |  107  |  4<L>  ----------------------------<  95  4.1   |  29  |  0.66    Ca    8.6      04 Sep 2019 07:23  Phos  4.1     09-04  Mg     2.2     09-04    TPro  7.7  /  Alb  3.5  /  TBili  1.4<H>  /  DBili  x   /  AST  18  /  ALT  14  /  AlkPhos  104  09-04          RADIOLOGY:    Drug Screen:        RECOMMENDATIONS    [ ]  [place]  Reviewed and Copied to Chart Chief Complaint: sickle cell crisis    HPI:  35 y/o female with PMHx of sickle cell disease presents to the ED with intractable upper extremity and back pain x1 day. Patient reports waking up around 1am this morning in severe pain and says she knew it was a pain crisis immediately. She says the pain is 10/10 in severity and felt most in her upper extremities b/l and her back. She also endorses nausea. She denies any fever, cough, polyuria or any other symptoms at this time. Patient reports she moved to [place] from [place] in July 2018 and has not found a new hematologist to follow yet. She says this is why she is not on any medications at home (was previously taking iron and folic acid). She has no other complaints at this time.    34 year old female with complaints of upper extremity pain. + sharp, spasms bilateral arms.  + upper back pain.  Pt with history of sickle cell disease. Last crisis - 2012.  Pt does not take opioids as outpt.  Pt also feels that opioids makes her feel drowsy and "out of it".            PAST MEDICAL & SURGICAL HISTORY:  Sickle cell disease  No significant past surgical history      FAMILY HISTORY:  Family history of sickle cell disease      SOCIAL HISTORY:  [x ] Denies Smoking, Alcohol, or Drug Use    Allergies    erythromycin (Rash)    Intolerances        PAIN MEDICATIONS:  acetaminophen  IVPB .. 1000 milliGRAM(s) IV Intermittent once  cyclobenzaprine 10 milliGRAM(s) Oral three times a day  ketorolac   Injectable 30 milliGRAM(s) IV Push every 6 hours  ondansetron Injectable 4 milliGRAM(s) IV Push every 6 hours PRN      Heme:  enoxaparin Injectable 40 milliGRAM(s) SubCutaneous daily    Antibiotics:    Cardiovascular:    GI:  senna 2 Tablet(s) Oral at bedtime    Endocrine:    All Other Medications:  dextrose 5% + sodium chloride 0.45% with potassium chloride 20 mEq/L 1000 milliLiter(s) IV Continuous <Continuous>  folic acid 1 milliGRAM(s) Oral daily  influenza   Vaccine 0.5 milliLiter(s) IntraMuscular once      REVIEW OF SYSTEMS:    CONSTITUTIONAL: No fever, weight loss, or fatigue  CARDIOVASCULAR: No chest pain, palpitations, dizziness, or leg swelling  GASTROINTESTINAL: No abdominal or epigastric pain.  No nausea, vomiting, or hematemesis.  No diarrhea. + constipation.  GENITOURINARY: No dysuria, frequency, hematuria or incontinence  NEUROLOGICAL: No headaches, memory loss, loss of strength, numbness or tremors  MUSCULOSKELETAL: + bilateral upper extremity pain, + upper back pain    Vital Signs Last 24 Hrs  T(C): 36.6 (04 Sep 2019 07:37), Max: 36.7 (03 Sep 2019 15:00)  T(F): 97.9 (04 Sep 2019 07:37), Max: 98.1 (04 Sep 2019 00:10)  HR: 59 (04 Sep 2019 07:37) (50 - 95)  BP: 133/60 (04 Sep 2019 07:37) (110/75 - 143/70)  BP(mean): --  RR: 17 (04 Sep 2019 07:37) (15 - 20)  SpO2: 100% (04 Sep 2019 07:37) (99% - 100%)    PAIN SCORE:  5/10       SCALE USED: (1-10 VNRS)    PHYSICAL EXAM:    GENERAL: NAD  NERVOUS SYSTEM: Alert and oriented x3,   CHEST/LUNG: Clear to percussion bilaterally, no rale, rhonchi or wheezing  HEART: Regular rate and rhythm, No murmurs, rubs, or gallops   ABDOMEN: Soft, nontender, nondistended, Bowel sounds present  BACK: No CVA tenderness, No paravertebral tenderness  EXTREMITIES: +2 peripheral extremities, no edema, cyanosis + decreased rom + upper arm spasms and sharp pain    LABS:                          8.8    13.26 )-----------( 458      ( 04 Sep 2019 07:23 )             26.1     09-04    141  |  107  |  4<L>  ----------------------------<  95  4.1   |  29  |  0.66    Ca    8.6      04 Sep 2019 07:23  Phos  4.1     09-04  Mg     2.2     09-04    TPro  7.7  /  Alb  3.5  /  TBili  1.4<H>  /  DBili  x   /  AST  18  /  ALT  14  /  AlkPhos  104  09-04          RADIOLOGY:  < from: Xray Chest 2 Views PA/Lat (09.03.19 @ 10:22) >    EXAM:  XR CHEST PA LAT 2V                            PROCEDURE DATE:  09/03/2019          INTERPRETATION:  PROCEDURE: PA and lateral views of the chest.    CLINICAL INFORMATION: Chest pain.    COMPARISON: None.    FINDINGS:    Lungs: The lungs are clear.  Heart: The heart is top normal in size.  Mediastinum: The mediastinum is within normal limits.    IMPRESSION:    Clear lungs.    < end of copied text >    Drug Screen:        RECOMMENDATIONS    [ ]  [place]  Reviewed and Copied to Chart

## 2019-09-04 NOTE — DISCHARGE NOTE PROVIDER - HOSPITAL COURSE
33 y/o female with PMHx of sickle cell disease presents to the ED with intractable upper extremity and back pain x1 day. Patient reports waking up around 1am this morning in severe pain and says she knew it was a pain crisis immediately. She says the pain is 10/10 in severity and felt most in her upper extremities b/l and her back. She also endorses nausea. She denies any fever, cough, polyuria or any other symptoms at this time. Patient reports she moved to [place] from [place] in July 2018 and has not found a new hematologist to follow yet. She says this is why she is not on any medications at home (was previously taking iron and folic acid). She has no other complaints at this time.        ED course: s/p 1L NS bolus x2. s/p Dilaudid 1mg IV push x3 with minimal improvement in pain.     Sickle cell pain crisis.  Plan: p/w severe pain in upper extremities and back x1 day    Hb 10.1 on admission with abs retic ct of 328        WBC count 15.81    pt not on any medications at home for SCD and has no hematologist at present     toradol 30mg IV push q6 standing as per pain mgt    zofran prn for nausea    flexeril q8 for muscle spasm    heme/onc consult :    Would continue with iv acetaminophen and toradol as patient has many CNS side effects with Dilaudid     folic acid 1 mg po daily    f/u Hb electrophoresis.     Sickle cell disease.  Plan: management as above    pain control prn    will arrange outpatient heme/onc referral for continued care on discharge.     Hyperbilirubinemia.  Plan: tbili on admission 1.9    pt reports known history of gallstones    denies abdominal pain at present    likely 2/2 to hemolysis    f/u CMP daily.     Anemia.  Plan: likely 2/2 to SCD    baseline unknown    f/u CBC daily    f/u iron studies    f/u haptoglobin. 33 y/o female with PMHx of sickle cell disease presents to the ED with intractable upper extremity and back pain x1 day. Patient reports waking up around 1am this morning in severe pain and says she knew it was a pain crisis immediately. She says the pain is 10/10 in severity and felt most in her upper extremities b/l and her back. She also endorses nausea. She denies any fever, cough, polyuria or any other symptoms at this time. Patient reports she moved to [place] from [place] in July 2018 and has not found a new hematologist to follow yet. She says this is why she is not on any medications at home (was previously taking iron and folic acid). She has no other complaints at this time.        ED course: s/p 1L NS bolus x2. s/p Dilaudid 1mg IV push x3 with minimal improvement in pain.     Sickle cell pain crisis.  Plan: p/w severe pain in upper extremities and back x1 day    Hb 10.1 on admission with abs retic ct of 328,,WBC count 15.81    pt not on any medications at home for SCD and has no hematologist at present     toradol, zofran, flexeril q8 for muscle spasm    will arrange outpatient heme/onc referral for continued care on discharge.     Hyperbilirubinemia.  Plan: tbili on admission 1.9    pt reports known history of gallstones.        PT is stable and ready to discharge. 35 y/o female with PMHx of sickle cell disease presents to the ED with intractable upper extremity and back pain x1 day. Patient reports waking up around 1am this morning in severe pain and says she knew it was a pain crisis immediately. She says the pain is 10/10 in severity and felt most in her upper extremities b/l and her back. She also endorses nausea. She denies any fever, cough, polyuria or any other symptoms at this time. Patient reports she moved to [place] from [place] in July 2018 and has not found a new hematologist to follow yet. She says this is why she is not on any medications at home (was previously taking iron and folic acid). She has no other complaints at this time.        ED course: s/p 1L NS bolus x2. s/p Dilaudid 1mg IV push x3 with minimal improvement in pain.     Sickle cell pain crisis.  Plan: p/w severe pain in upper extremities and back x1 day    Hb 10.1 on admission with abs retic ct of 328,,WBC count 15.81    pt not on any medications at home for SCD and has no hematologist at present     toradol, zofran, flexeril q8 for muscle spasm    Pain is better controlled now with toradol.     will arrange outpatient heme/onc referral for continued care on discharge.     Hyperbilirubinemia.  Plan: tbili on admission 1.9    pt reports known history of gallstones.        PT is stable and ready to discharge.

## 2019-09-04 NOTE — PROGRESS NOTE ADULT - PROBLEM SELECTOR PLAN 1
p/w severe pain in upper extremities and back x1 day  Hb 10.1 on admission with abs retic ct of 328    WBC count 15.81  pt not on any medications at home for SCD and has no hematologist at present  startED dilaudid 1mg q3 prn  started toradol 30mg IV push q6 standing as per pain mgt  zofran prn for nausea  pain management following  heme/onc consult :  Would continue with iv acetaminophen and toradol as patient has many CNS side effects with Dilaudid  -Would add folic acid 1 mg po daily and on discharge  -Check retic count and LDH    f/u Hb electrophoresis p/w severe pain in upper extremities and back x1 day  Hb 10.1 on admission with abs retic ct of 328    WBC count 15.81  pt not on any medications at home for SCD and has no hematologist at present   toradol 30mg IV push q6 standing as per pain mgt  zofran prn for nausea  flexeril q8 for muscle spasm  heme/onc consult :  Would continue with iv acetaminophen and toradol as patient has many CNS side effects with Dilaudid   folic acid 1 mg po daily  f/u Hb electrophoresis

## 2019-09-04 NOTE — PROGRESS NOTE ADULT - SUBJECTIVE AND OBJECTIVE BOX
PGY1 Note discussed with Supervising Resident and Primary Attending.    Patient is a 34y old  Female who presents with a chief complaint of sickle cell pain crisis (04 Sep 2019 08:27)      INTERVAL HPI/OVERNIGHT EVENTS :    ***********************************************************************************************************    MEDICATIONS  (STANDING):  dextrose 5% + sodium chloride 0.45% with potassium chloride 20 mEq/L 1000 milliLiter(s) (100 mL/Hr) IV Continuous <Continuous>  enoxaparin Injectable 40 milliGRAM(s) SubCutaneous daily  folic acid 1 milliGRAM(s) Oral daily  influenza   Vaccine 0.5 milliLiter(s) IntraMuscular once  ketorolac   Injectable 30 milliGRAM(s) IV Push every 6 hours    MEDICATIONS  (PRN):  cyclobenzaprine 10 milliGRAM(s) Oral three times a day PRN Muscle Spasm  ondansetron Injectable 4 milliGRAM(s) IV Push every 6 hours PRN Nausea and/or Vomiting      ***********************************************************************************************************    Allergies    erythromycin (Rash)    Intolerances        ***********************************************************************************************************    REVIEW OF SYSTEMS :  * CONSTITUTIONAL      :ALL OVER GERNALIZED FATIGUE, No Fever, Weight loss,   * EYES                             : No eye pain , Visual disturbances or Discharge  * RESPIRATORY             : No Cough, Wheezing, Chills or Hemoptysis; No shortness of breath  * CARDIOVASCULAR     : No Chest pain, Palpitations, Dizziness, or Leg swelling  * GASTROINTESTINAL  : No Abdominal or Epigastric pain. No Nausea, Vomiting or Hematemesis; No Diarrhea or Constipation. No Melena or Hematochezia.  * GENITOURINARY        : No Dysuria , Frequency , Haematuria   * NEUROLOGICAL          : No Headaches, Memory loss, Loss of trength, Numbness, or Tremors  * MUSCULOSKELETAL   : No Joint pain  * PSYCHIATRY                 : No Depression or Anxiety   * HEME/LYMPH              : No Easy Bruising or Bleeding gums  * SKIN                               : No Itching, Burning, Rashes, or Lesions     ***********************************************************************************************************    Vital Signs Last 24 Hrs  T(C): 36.6 (04 Sep 2019 07:37), Max: 36.7 (03 Sep 2019 15:00)  T(F): 97.9 (04 Sep 2019 07:37), Max: 98.1 (04 Sep 2019 00:10)  HR: 59 (04 Sep 2019 07:37) (50 - 95)  BP: 133/60 (04 Sep 2019 07:37) (110/75 - 143/70)  BP(mean): --  RR: 17 (04 Sep 2019 07:37) (15 - 20)  SpO2: 100% (04 Sep 2019 07:37) (99% - 100%)    ***********************************************************************************************************    PHYSICAL EXAM :  * GENERAL                 : NAD, Well-groomed, Well-developed, GENERLIZED PAIN  * HEAD                       :  Atraumatic, Normocephalic  * EYES                         : EOMI, PERRLA, Conjunctiva and Sclera clear  * ENT                           : Moist Mucous Membranes  * NECK                         : Supple, No JVD, Normal Thyroid  * CHEST/LUNG           : Clear to Auscultation bilaterally; No Rales, Rhonchi, Wheezing or Rubs  * HEART                       : Regular Rate and Rhythm; No murmurs, Rubs or gallops  * ABDOMEN                : Soft, Non-tender, Non-distended; Bowel Sounds present  * NERVOUS SYSTEM  :  Alert & Oriented X3, Good Concentration; Motor Strength 5/5 B/L UL LL ; DTRs 2+ Intact and Symmetric  * EXTREMITIES            :  2+ Peripheral Pulses, No clubbing, cyanosis, or edema  * SKIN                           : No Rashes or Lesions    **********************************************************************************************************  LABS:                          8.8    13.26 )-----------( 458      ( 04 Sep 2019 07:23 )             26.1     09-04    141  |  107  |  4<L>  ----------------------------<  95  4.1   |  29  |  0.66    Ca    8.6      04 Sep 2019 07:23  Phos  4.1     09-04  Mg     2.2     09-04    TPro  7.7  /  Alb  3.5  /  TBili  1.4<H>  /  DBili  x   /  AST  18  /  ALT  14  /  AlkPhos  104  09-04        CAPILLARY BLOOD GLUCOSE          **********************************************************************************************************    RADIOLOGY & ADDITIONAL TESTS:   No radiological imaging was required    Imaging Personally Reviewed   :  [ ] YES      Consultant(s) Notes Reviewed :  [ ] YES

## 2019-09-04 NOTE — CONSULT NOTE ADULT - ASSESSMENT
35 y/o female with PMHx of sickle cell disease presents to the ED with intractable upper extremity and back pain x1 day. Patient reports waking up around 1am this morning in severe pain and says she knew it was a pain crisis immediately. She says the pain is 10/10 in severity and felt most in her upper extremities b/l and her back. She also endorses nausea. She denies any fever, cough, polyuria or any other symptoms at this time. Patient reports she moved to [place] from [place] in July 2018 and has not found a new hematologist to follow yet. She says this is why she is not on any medications at home (was previously taking iron and folic acid). She has no other complaints at this time. She reports two previous admissions for crisis in 2011 and 2002 in part related to her high HbF level. Was never prescribed Hydrea. Transfused twice during her two pregnancies. Never intubated. Took penicillin as a child for prophyslaxis. Dilaudid does not agree with her. Responds better to iv acetominophen.    ED course: s/p 1L NS bolus x2. s/p Dilaudid 1mg IV push x3 with minimal improvement in pain. (03 Sep 2019 15:07)     Problem # 1 Sickle Cell Disease / Crisis  -Would continue with iv acetaminophen and toradol as patient has many CNS side effects with Dilaudid  -Would add folic acid 1 mg po daily and on discharge  -Check retic count and LDH  -Tx Hgb < 6 or symptomatic  -Recommended to patient to make appointment with [facility] where investigative treatments are available.    Thank you. Will follow For questions call [phone]. DISPLAY PLAN FREE TEXT

## 2019-09-04 NOTE — PROGRESS NOTE ADULT - PROBLEM SELECTOR PLAN 4
likely 2/2 to SCD  baseline unknown  f/u CBC daily  f/u iron studies  f/u haptoglobin likely 2/2 to SCD  baseline unknown  CBC: hb 8.8    iron studies transferin 193, ferritin 322, TIBC wnl  f/u haptoglobin

## 2019-09-05 LAB
ALBUMIN SERPL ELPH-MCNC: 3.6 G/DL — SIGNIFICANT CHANGE UP (ref 3.5–5)
ALP SERPL-CCNC: 119 U/L — SIGNIFICANT CHANGE UP (ref 40–120)
ALT FLD-CCNC: 14 U/L DA — SIGNIFICANT CHANGE UP (ref 10–60)
ANION GAP SERPL CALC-SCNC: 7 MMOL/L — SIGNIFICANT CHANGE UP (ref 5–17)
AST SERPL-CCNC: 22 U/L — SIGNIFICANT CHANGE UP (ref 10–40)
BASOPHILS # BLD AUTO: 0.05 K/UL — SIGNIFICANT CHANGE UP (ref 0–0.2)
BASOPHILS NFR BLD AUTO: 0.3 % — SIGNIFICANT CHANGE UP (ref 0–2)
BILIRUB SERPL-MCNC: 1.4 MG/DL — HIGH (ref 0.2–1.2)
BUN SERPL-MCNC: 3 MG/DL — LOW (ref 7–18)
CALCIUM SERPL-MCNC: 8.8 MG/DL — SIGNIFICANT CHANGE UP (ref 8.4–10.5)
CHLORIDE SERPL-SCNC: 104 MMOL/L — SIGNIFICANT CHANGE UP (ref 96–108)
CO2 SERPL-SCNC: 28 MMOL/L — SIGNIFICANT CHANGE UP (ref 22–31)
CREAT SERPL-MCNC: 0.53 MG/DL — SIGNIFICANT CHANGE UP (ref 0.5–1.3)
EOSINOPHIL # BLD AUTO: 0.12 K/UL — SIGNIFICANT CHANGE UP (ref 0–0.5)
EOSINOPHIL NFR BLD AUTO: 0.8 % — SIGNIFICANT CHANGE UP (ref 0–6)
GLUCOSE SERPL-MCNC: 107 MG/DL — HIGH (ref 70–99)
HAPTOGLOB SERPL-MCNC: <20 MG/DL — LOW (ref 34–200)
HCT VFR BLD CALC: 27.2 % — LOW (ref 34.5–45)
HGB BLD-MCNC: 9.4 G/DL — LOW (ref 11.5–15.5)
HGB S BLD QL: POSITIVE
HGB S BLD QL: POSITIVE
IMM GRANULOCYTES NFR BLD AUTO: 0.6 % — SIGNIFICANT CHANGE UP (ref 0–1.5)
LDH SERPL L TO P-CCNC: 314 U/L — HIGH (ref 120–225)
LYMPHOCYTES # BLD AUTO: 19.7 % — SIGNIFICANT CHANGE UP (ref 13–44)
LYMPHOCYTES # BLD AUTO: 3.03 K/UL — SIGNIFICANT CHANGE UP (ref 1–3.3)
MAGNESIUM SERPL-MCNC: 2.1 MG/DL — SIGNIFICANT CHANGE UP (ref 1.6–2.6)
MCHC RBC-ENTMCNC: 30.6 PG — SIGNIFICANT CHANGE UP (ref 27–34)
MCHC RBC-ENTMCNC: 34.6 GM/DL — SIGNIFICANT CHANGE UP (ref 32–36)
MCV RBC AUTO: 88.6 FL — SIGNIFICANT CHANGE UP (ref 80–100)
MONOCYTES # BLD AUTO: 1.06 K/UL — HIGH (ref 0–0.9)
MONOCYTES NFR BLD AUTO: 6.9 % — SIGNIFICANT CHANGE UP (ref 2–14)
NEUTROPHILS # BLD AUTO: 11.01 K/UL — HIGH (ref 1.8–7.4)
NEUTROPHILS NFR BLD AUTO: 71.7 % — SIGNIFICANT CHANGE UP (ref 43–77)
NRBC # BLD: 0 /100 WBCS — SIGNIFICANT CHANGE UP (ref 0–0)
PHOSPHATE SERPL-MCNC: 3.8 MG/DL — SIGNIFICANT CHANGE UP (ref 2.5–4.5)
PLATELET # BLD AUTO: 449 K/UL — HIGH (ref 150–400)
POTASSIUM SERPL-MCNC: 3.6 MMOL/L — SIGNIFICANT CHANGE UP (ref 3.5–5.3)
POTASSIUM SERPL-SCNC: 3.6 MMOL/L — SIGNIFICANT CHANGE UP (ref 3.5–5.3)
PROT SERPL-MCNC: 8 G/DL — SIGNIFICANT CHANGE UP (ref 6–8.3)
RBC # BLD: 3.07 M/UL — LOW (ref 3.8–5.2)
RBC # BLD: 3.07 M/UL — LOW (ref 3.8–5.2)
RBC # FLD: 16.5 % — HIGH (ref 10.3–14.5)
RETICS #: 278.4 K/UL — HIGH (ref 25–125)
RETICS/RBC NFR: 9.1 % — HIGH (ref 0.5–2.5)
SODIUM SERPL-SCNC: 139 MMOL/L — SIGNIFICANT CHANGE UP (ref 135–145)
SOLUBILITY: POSITIVE
SOLUBILITY: POSITIVE
WBC # BLD: 15.36 K/UL — HIGH (ref 3.8–10.5)
WBC # FLD AUTO: 15.36 K/UL — HIGH (ref 3.8–10.5)

## 2019-09-05 PROCEDURE — 83540 ASSAY OF IRON: CPT

## 2019-09-05 PROCEDURE — 85027 COMPLETE CBC AUTOMATED: CPT

## 2019-09-05 PROCEDURE — 82746 ASSAY OF FOLIC ACID SERUM: CPT

## 2019-09-05 PROCEDURE — 99285 EMERGENCY DEPT VISIT HI MDM: CPT | Mod: 25

## 2019-09-05 PROCEDURE — 99233 SBSQ HOSP IP/OBS HIGH 50: CPT

## 2019-09-05 PROCEDURE — 83020 HEMOGLOBIN ELECTROPHORESIS: CPT

## 2019-09-05 PROCEDURE — 36415 COLL VENOUS BLD VENIPUNCTURE: CPT

## 2019-09-05 PROCEDURE — 84100 ASSAY OF PHOSPHORUS: CPT

## 2019-09-05 PROCEDURE — 82607 VITAMIN B-12: CPT

## 2019-09-05 PROCEDURE — 85045 AUTOMATED RETICULOCYTE COUNT: CPT

## 2019-09-05 PROCEDURE — 80061 LIPID PANEL: CPT

## 2019-09-05 PROCEDURE — 83010 ASSAY OF HAPTOGLOBIN QUANT: CPT

## 2019-09-05 PROCEDURE — 84466 ASSAY OF TRANSFERRIN: CPT

## 2019-09-05 PROCEDURE — 80307 DRUG TEST PRSMV CHEM ANLYZR: CPT

## 2019-09-05 PROCEDURE — 83036 HEMOGLOBIN GLYCOSYLATED A1C: CPT

## 2019-09-05 PROCEDURE — 71046 X-RAY EXAM CHEST 2 VIEWS: CPT

## 2019-09-05 PROCEDURE — 82728 ASSAY OF FERRITIN: CPT

## 2019-09-05 PROCEDURE — 84702 CHORIONIC GONADOTROPIN TEST: CPT

## 2019-09-05 PROCEDURE — 83615 LACTATE (LD) (LDH) ENZYME: CPT

## 2019-09-05 PROCEDURE — 83550 IRON BINDING TEST: CPT

## 2019-09-05 PROCEDURE — 84443 ASSAY THYROID STIM HORMONE: CPT

## 2019-09-05 PROCEDURE — 80053 COMPREHEN METABOLIC PANEL: CPT

## 2019-09-05 PROCEDURE — 83735 ASSAY OF MAGNESIUM: CPT

## 2019-09-05 PROCEDURE — 90686 IIV4 VACC NO PRSV 0.5 ML IM: CPT

## 2019-09-05 RX ORDER — FOLIC ACID 0.8 MG
1 TABLET ORAL
Qty: 30 | Refills: 0
Start: 2019-09-05 | End: 2019-10-04

## 2019-09-05 RX ORDER — KETOROLAC TROMETHAMINE 30 MG/ML
1 SYRINGE (ML) INJECTION
Qty: 9 | Refills: 0
Start: 2019-09-05 | End: 2019-09-07

## 2019-09-05 RX ORDER — CYCLOBENZAPRINE HYDROCHLORIDE 10 MG/1
1 TABLET, FILM COATED ORAL
Qty: 21 | Refills: 0
Start: 2019-09-05 | End: 2019-09-11

## 2019-09-05 RX ORDER — SENNA PLUS 8.6 MG/1
2 TABLET ORAL
Qty: 60 | Refills: 0
Start: 2019-09-05 | End: 2019-10-04

## 2019-09-05 RX ADMIN — Medication 30 MILLIGRAM(S): at 11:30

## 2019-09-05 RX ADMIN — CYCLOBENZAPRINE HYDROCHLORIDE 10 MILLIGRAM(S): 10 TABLET, FILM COATED ORAL at 06:01

## 2019-09-05 RX ADMIN — Medication 1000 MILLIGRAM(S): at 04:45

## 2019-09-05 RX ADMIN — Medication 400 MILLIGRAM(S): at 11:30

## 2019-09-05 RX ADMIN — Medication 400 MILLIGRAM(S): at 04:15

## 2019-09-05 RX ADMIN — Medication 104 MILLIGRAM(S): at 04:15

## 2019-09-05 RX ADMIN — Medication 1000 MILLIGRAM(S): at 11:53

## 2019-09-05 RX ADMIN — DEXTROSE MONOHYDRATE, SODIUM CHLORIDE, AND POTASSIUM CHLORIDE 100 MILLILITER(S): 50; .745; 4.5 INJECTION, SOLUTION INTRAVENOUS at 04:15

## 2019-09-05 RX ADMIN — CYCLOBENZAPRINE HYDROCHLORIDE 10 MILLIGRAM(S): 10 TABLET, FILM COATED ORAL at 13:47

## 2019-09-05 RX ADMIN — Medication 1 MILLIGRAM(S): at 11:29

## 2019-09-05 RX ADMIN — Medication 30 MILLIGRAM(S): at 06:20

## 2019-09-05 RX ADMIN — Medication 30 MILLIGRAM(S): at 06:01

## 2019-09-05 RX ADMIN — INFLUENZA VIRUS VACCINE 0.5 MILLILITER(S): 15; 15; 15; 15 SUSPENSION INTRAMUSCULAR at 11:34

## 2019-09-05 RX ADMIN — Medication 30 MILLIGRAM(S): at 11:53

## 2019-09-05 NOTE — PROGRESS NOTE ADULT - SUBJECTIVE AND OBJECTIVE BOX
Patient is a 34y old  Female who presents with a chief complaint of sickle cell pain crisis (04 Sep 2019 14:46)       Pt is seen and examined  pt is awake and lying in bed  pt seems better with pain level at three           ROS:  Negative except for:    MEDICATIONS  (STANDING):  cyclobenzaprine 10 milliGRAM(s) Oral three times a day  dextrose 5% + sodium chloride 0.45% with potassium chloride 20 mEq/L 1000 milliLiter(s) (100 mL/Hr) IV Continuous <Continuous>  enoxaparin Injectable 40 milliGRAM(s) SubCutaneous daily  folic acid 1 milliGRAM(s) Oral daily  ketorolac   Injectable 30 milliGRAM(s) IV Push every 6 hours  senna 2 Tablet(s) Oral at bedtime    MEDICATIONS  (PRN):  metoclopramide  IVPB 10 milliGRAM(s) IV Intermittent every 6 hours PRN muscle spasm  ondansetron Injectable 4 milliGRAM(s) IV Push every 6 hours PRN Nausea and/or Vomiting      Allergies    erythromycin (Rash)    Intolerances        Vital Signs Last 24 Hrs  T(C): 36.9 (04 Sep 2019 23:56), Max: 36.9 (04 Sep 2019 23:56)  T(F): 98.5 (04 Sep 2019 23:56), Max: 98.5 (04 Sep 2019 23:56)  HR: 72 (04 Sep 2019 23:56) (72 - 90)  BP: 154/55 (04 Sep 2019 23:56) (138/86 - 154/55)  BP(mean): --  RR: 15 (04 Sep 2019 23:56) (15 - 16)  SpO2: 100% (04 Sep 2019 23:56) (96% - 100%)    PHYSICAL EXAM  General: adult in NAD  HEENT: clear oropharynx, anicteric sclera, pink conjunctiva  Neck: supple  CV: normal S1/S2 with no murmur rubs or gallops  Lungs: positive air movement b/l ant lungs,clear to auscultation, no wheezes, no rales  Abdomen: soft non-tender non-distended, no hepatosplenomegaly  Ext: no clubbing cyanosis or edema  Skin: no rashes and no petechiae  Neuro: alert and oriented X 4, no focal deficits  LABS:                          9.4    15.36 )-----------( 449      ( 05 Sep 2019 06:29 )             27.2         Mean Cell Volume : 88.6 fl  Mean Cell Hemoglobin : 30.6 pg  Mean Cell Hemoglobin Concentration : 34.6 gm/dL  Auto Neutrophil # : 11.01 K/uL  Auto Lymphocyte # : 3.03 K/uL  Auto Monocyte # : 1.06 K/uL  Auto Eosinophil # : 0.12 K/uL  Auto Basophil # : 0.05 K/uL  Auto Neutrophil % : 71.7 %  Auto Lymphocyte % : 19.7 %  Auto Monocyte % : 6.9 %  Auto Eosinophil % : 0.8 %  Auto Basophil % : 0.3 %    Serial CBC's  09-05 @ 06:29  Hct-27.2 / Hgb-9.4 / Plat-449 / RBC-3.07 / WBC-15.36          Serial CBC's  09-04 @ 07:23  Hct-26.1 / Hgb-8.8 / Plat-458 / RBC-2.91 / WBC-13.26          Serial CBC's  09-03 @ 09:36  Hct-29.3 / Hgb-10.1 / Plat-541 / RBC-3.31 / WBC-15.87            09-05    139  |  104  |  3<L>  ----------------------------<  107<H>  3.6   |  28  |  0.53    Ca    8.8      05 Sep 2019 06:29  Phos  3.8     09-05  Mg     2.1     09-05    TPro  8.0  /  Alb  3.6  /  TBili  1.4<H>  /  DBili  x   /  AST  22  /  ALT  14  /  AlkPhos  119  09-05          Reticulocyte Percent: 9.1 % (09-05-19 @ 06:29)  WBC Count: 15.36 K/uL (09-05-19 @ 06:29)  Hemoglobin: 9.4 g/dL (09-05-19 @ 06:29)  Hematocrit: 27.2 % (09-05-19 @ 06:29)  Platelet Count - Automated: 449 K/uL (09-05-19 @ 06:29)  Folate, Serum: 9.2 ng/mL (09-04-19 @ 10:28)  Vitamin B12, Serum: 457 pg/mL (09-04-19 @ 10:28)  Ferritin, Serum: 322 ng/mL (09-04-19 @ 10:28)  WBC Count: 13.26 K/uL (09-04-19 @ 07:23)  Hemoglobin: 8.8 g/dL (09-04-19 @ 07:23)  Hematocrit: 26.1 % (09-04-19 @ 07:23)  Platelet Count - Automated: 458 K/uL (09-04-19 @ 07:23)  Iron - Total Binding Capacity.: 251 ug/dL (09-04-19 @ 07:23)  Hemoglobin: 10.1 g/dL (09-03-19 @ 09:36)  Platelet Count - Automated: 541 K/uL (09-03-19 @ 09:36)  WBC Count: 15.87 K/uL (09-03-19 @ 09:36)  Hematocrit: 29.3 % (09-03-19 @ 09:36)  Reticulocyte Percent: 9.9 % (09-03-19 @ 09:36)                BLOOD SMEAR INTERPRETATION:       RADIOLOGY & ADDITIONAL STUDIES:

## 2019-09-05 NOTE — PROGRESS NOTE ADULT - PROBLEM SELECTOR PLAN 1
- pt significantly better today  - pt was placed on toradol atc, iv tylenol for 4 doses and flexeril atc  - pt also given reglan  - dc home today on toradol 10mg po q 8 for 3 days and flexeril prn  - reglan as per primary team

## 2019-09-05 NOTE — PROGRESS NOTE ADULT - ASSESSMENT
33 y/o female with PMHx of sickle cell disease presents to the ED with intractable upper extremity and back pain x1 day. Patient reports waking up around 1am this morning in severe pain and says she knew it was a pain crisis immediately. She says the pain is 10/10 in severity and felt most in her upper extremities b/l and her back. She also endorses nausea. She denies any fever, cough, polyuria or any other symptoms at this time. Patient reports she moved to [place] from [place] in July 2018 and has not found a new hematologist to follow yet. She says this is why she is not on any medications at home (was previously taking iron and folic acid). She has no other complaints at this time. She reports two previous admissions for crisis in 2011 and 2002 in part related to her high HbF level. Was never prescribed Hydrea. Transfused twice during her two pregnancies. Never intubated. Took penicillin as a child for prophyslaxis. Dilaudid does not agree with her. Responds better to iv acetominophen.    ED course: s/p 1L NS bolus x2. s/p Dilaudid 1mg IV push x3 with minimal improvement in pain. (03 Sep 2019 15:07)     Problem # 1 Sickle Cell Disease / Crisis  -Overall improved and nearing pain level appropriate for discharge  -Would continue with iv acetaminophen and toradol as patient has many CNS side effects with Dilaudid  -Pain team consult noted  -Would add folic acid 1 mg po daily and on discharge  -retic count elevated appropriately at 9.1 and Lactate Dehydrogenase, Serum: 314 U/L (09.05.19 @ 06:29)  -Patient made inquiries to undergo evaluation at [facility]    -Tx Hgb < 6 or symptomatic  -Recommended to patient to make appointment with [facility] cell [facility] where investigative treatments are available.    Thank you. Will follow For questions call [phone].

## 2019-09-05 NOTE — DISCHARGE NOTE NURSING/CASE MANAGEMENT/SOCIAL WORK - PATIENT PORTAL LINK FT
You can access the FollowMyHealth Patient Portal offered by [facility] by registering at the following website: [email or web address]. By joining [employer]’s FollowMyHealth portal, you will also be able to view your health information using other applications (apps) compatible with our system.

## 2019-09-09 LAB
HEMOGLOBIN INTERPRETATION: SIGNIFICANT CHANGE UP
HEMOGLOBIN INTERPRETATION: SIGNIFICANT CHANGE UP
HGB A MFR BLD: 0 % — LOW (ref 95.8–98)
HGB A MFR BLD: 0 % — LOW (ref 95.8–98)
HGB A2 MFR BLD: 2.5 % — SIGNIFICANT CHANGE UP (ref 2–3.2)
HGB A2 MFR BLD: 2.6 % — SIGNIFICANT CHANGE UP (ref 2–3.2)
HGB F MFR BLD: 18.2 % — HIGH (ref 0–1)
HGB F MFR BLD: 18.3 % — HIGH (ref 0–1)
HGB S MFR BLD: 79.2 % — HIGH
HGB S MFR BLD: 79.2 % — HIGH

## 2020-03-21 NOTE — H&P ADULT - NSICDXPASTSURGICALHX_GEN_ALL_CORE_FT
2 RN Skin Check    2 RN skin check complete.   Devices in place: NA.  Skin assessed under devices: N\A.  Confirmed pressure ulcers found on: NA.  New potential pressure ulcers noted on NA. Wound consult placed N/A.  The following interventions in place Pillows.    Skin clean dry intact.  Dry flaky on feet and small scab on L shin. No open wounds     
Pt verbalized understanding of discharge instructions.  Pt refused to have his wilson bag changed to a leg bag, was given a leg back to use at home.  Pt was discharged with wilson catheter in place. All belongings incluiding pt own walker was brought down to the front of the building with pt in wheelchair and assisted into personal vehicle with girlfriend.    
Report received from MACK Rodriguez. Pt denies needs at this time. Safety precautions in place. Call light within reach.    
Telemetry Shift Summary     Rhythm AFIB  HR Range   Ectopy R PVC R coup R trig  Measurements na/0.14/na       Normal Values  Rhythm SR  HR Range    Measurements 0.12-0.20 / 0.06-0.10  / 0.30-0.52    Ongoing care. No acute issues over night. Patient did have much rest, but had no acute c/o anything. Patient just couldn't get comfortable and wanted to talk. Sher draining well. Continuing monitoring and abx as ordered.   
Telemetry Shift Summary     Rhythm Afib  HR Range -/0.16/-  Ectopy FPVCs  Measurements -/0.16/-           Normal Values  Rhythm SR  HR Range    Measurements 0.12-0.20 / 0.06-0.10  / 0.30-0.52    
PAST SURGICAL HISTORY:  No significant past surgical history

## 2023-11-19 ENCOUNTER — INPATIENT (INPATIENT)
Facility: HOSPITAL | Age: 38
LOS: 1 days | Discharge: HOME | DRG: 812 | End: 2023-11-21
Attending: STUDENT IN AN ORGANIZED HEALTH CARE EDUCATION/TRAINING PROGRAM | Admitting: STUDENT IN AN ORGANIZED HEALTH CARE EDUCATION/TRAINING PROGRAM
Payer: COMMERCIAL

## 2023-11-19 VITALS
TEMPERATURE: 99 F | HEIGHT: 69 IN | RESPIRATION RATE: 17 BRPM | DIASTOLIC BLOOD PRESSURE: 67 MMHG | WEIGHT: 237 LBS | SYSTOLIC BLOOD PRESSURE: 102 MMHG | OXYGEN SATURATION: 97 % | HEART RATE: 86 BPM

## 2023-11-19 DIAGNOSIS — D57.00 HB-SS DISEASE WITH CRISIS, UNSPECIFIED: ICD-10-CM

## 2023-11-19 LAB
ALBUMIN SERPL ELPH-MCNC: 4.5 G/DL — SIGNIFICANT CHANGE UP (ref 3.3–5)
ALBUMIN SERPL ELPH-MCNC: 4.5 G/DL — SIGNIFICANT CHANGE UP (ref 3.3–5)
ALP SERPL-CCNC: 117 U/L — SIGNIFICANT CHANGE UP (ref 40–120)
ALP SERPL-CCNC: 117 U/L — SIGNIFICANT CHANGE UP (ref 40–120)
ALT FLD-CCNC: 13 U/L — SIGNIFICANT CHANGE UP (ref 10–45)
ALT FLD-CCNC: 13 U/L — SIGNIFICANT CHANGE UP (ref 10–45)
ANION GAP SERPL CALC-SCNC: 15 MMOL/L — SIGNIFICANT CHANGE UP (ref 5–17)
ANION GAP SERPL CALC-SCNC: 15 MMOL/L — SIGNIFICANT CHANGE UP (ref 5–17)
APPEARANCE UR: CLEAR — SIGNIFICANT CHANGE UP
APPEARANCE UR: CLEAR — SIGNIFICANT CHANGE UP
AST SERPL-CCNC: 28 U/L — SIGNIFICANT CHANGE UP (ref 10–40)
AST SERPL-CCNC: 28 U/L — SIGNIFICANT CHANGE UP (ref 10–40)
BACTERIA # UR AUTO: ABNORMAL /HPF
BACTERIA # UR AUTO: ABNORMAL /HPF
BASOPHILS # BLD AUTO: 0.07 K/UL — SIGNIFICANT CHANGE UP (ref 0–0.2)
BASOPHILS # BLD AUTO: 0.07 K/UL — SIGNIFICANT CHANGE UP (ref 0–0.2)
BASOPHILS NFR BLD AUTO: 0.4 % — SIGNIFICANT CHANGE UP (ref 0–2)
BASOPHILS NFR BLD AUTO: 0.4 % — SIGNIFICANT CHANGE UP (ref 0–2)
BILIRUB SERPL-MCNC: 2 MG/DL — HIGH (ref 0.2–1.2)
BILIRUB SERPL-MCNC: 2 MG/DL — HIGH (ref 0.2–1.2)
BILIRUB UR-MCNC: NEGATIVE — SIGNIFICANT CHANGE UP
BILIRUB UR-MCNC: NEGATIVE — SIGNIFICANT CHANGE UP
BUN SERPL-MCNC: 10 MG/DL — SIGNIFICANT CHANGE UP (ref 7–23)
BUN SERPL-MCNC: 10 MG/DL — SIGNIFICANT CHANGE UP (ref 7–23)
CALCIUM SERPL-MCNC: 9.3 MG/DL — SIGNIFICANT CHANGE UP (ref 8.4–10.5)
CALCIUM SERPL-MCNC: 9.3 MG/DL — SIGNIFICANT CHANGE UP (ref 8.4–10.5)
CAST: 1 /LPF — SIGNIFICANT CHANGE UP (ref 0–4)
CAST: 1 /LPF — SIGNIFICANT CHANGE UP (ref 0–4)
CHLORIDE SERPL-SCNC: 102 MMOL/L — SIGNIFICANT CHANGE UP (ref 96–108)
CHLORIDE SERPL-SCNC: 102 MMOL/L — SIGNIFICANT CHANGE UP (ref 96–108)
CO2 SERPL-SCNC: 22 MMOL/L — SIGNIFICANT CHANGE UP (ref 22–31)
CO2 SERPL-SCNC: 22 MMOL/L — SIGNIFICANT CHANGE UP (ref 22–31)
COLOR SPEC: YELLOW — SIGNIFICANT CHANGE UP
COLOR SPEC: YELLOW — SIGNIFICANT CHANGE UP
CREAT SERPL-MCNC: 0.63 MG/DL — SIGNIFICANT CHANGE UP (ref 0.5–1.3)
CREAT SERPL-MCNC: 0.63 MG/DL — SIGNIFICANT CHANGE UP (ref 0.5–1.3)
DIFF PNL FLD: NEGATIVE — SIGNIFICANT CHANGE UP
DIFF PNL FLD: NEGATIVE — SIGNIFICANT CHANGE UP
EGFR: 115 ML/MIN/1.73M2 — SIGNIFICANT CHANGE UP
EGFR: 115 ML/MIN/1.73M2 — SIGNIFICANT CHANGE UP
EOSINOPHIL # BLD AUTO: 0.01 K/UL — SIGNIFICANT CHANGE UP (ref 0–0.5)
EOSINOPHIL # BLD AUTO: 0.01 K/UL — SIGNIFICANT CHANGE UP (ref 0–0.5)
EOSINOPHIL NFR BLD AUTO: 0.1 % — SIGNIFICANT CHANGE UP (ref 0–6)
EOSINOPHIL NFR BLD AUTO: 0.1 % — SIGNIFICANT CHANGE UP (ref 0–6)
FLUAV AG NPH QL: SIGNIFICANT CHANGE UP
FLUAV AG NPH QL: SIGNIFICANT CHANGE UP
FLUBV AG NPH QL: SIGNIFICANT CHANGE UP
FLUBV AG NPH QL: SIGNIFICANT CHANGE UP
GLUCOSE SERPL-MCNC: 120 MG/DL — HIGH (ref 70–99)
GLUCOSE SERPL-MCNC: 120 MG/DL — HIGH (ref 70–99)
GLUCOSE UR QL: NEGATIVE MG/DL — SIGNIFICANT CHANGE UP
GLUCOSE UR QL: NEGATIVE MG/DL — SIGNIFICANT CHANGE UP
HCT VFR BLD CALC: 24.4 % — LOW (ref 34.5–45)
HCT VFR BLD CALC: 24.4 % — LOW (ref 34.5–45)
HGB BLD-MCNC: 8.6 G/DL — LOW (ref 11.5–15.5)
HGB BLD-MCNC: 8.6 G/DL — LOW (ref 11.5–15.5)
IMM GRANULOCYTES NFR BLD AUTO: 1.1 % — HIGH (ref 0–0.9)
IMM GRANULOCYTES NFR BLD AUTO: 1.1 % — HIGH (ref 0–0.9)
KETONES UR-MCNC: NEGATIVE MG/DL — SIGNIFICANT CHANGE UP
KETONES UR-MCNC: NEGATIVE MG/DL — SIGNIFICANT CHANGE UP
LDH SERPL L TO P-CCNC: 407 U/L — HIGH (ref 50–242)
LDH SERPL L TO P-CCNC: 407 U/L — HIGH (ref 50–242)
LEUKOCYTE ESTERASE UR-ACNC: NEGATIVE — SIGNIFICANT CHANGE UP
LEUKOCYTE ESTERASE UR-ACNC: NEGATIVE — SIGNIFICANT CHANGE UP
LYMPHOCYTES # BLD AUTO: 11.7 % — LOW (ref 13–44)
LYMPHOCYTES # BLD AUTO: 11.7 % — LOW (ref 13–44)
LYMPHOCYTES # BLD AUTO: 2.28 K/UL — SIGNIFICANT CHANGE UP (ref 1–3.3)
LYMPHOCYTES # BLD AUTO: 2.28 K/UL — SIGNIFICANT CHANGE UP (ref 1–3.3)
MCHC RBC-ENTMCNC: 31.6 PG — SIGNIFICANT CHANGE UP (ref 27–34)
MCHC RBC-ENTMCNC: 31.6 PG — SIGNIFICANT CHANGE UP (ref 27–34)
MCHC RBC-ENTMCNC: 35.2 GM/DL — SIGNIFICANT CHANGE UP (ref 32–36)
MCHC RBC-ENTMCNC: 35.2 GM/DL — SIGNIFICANT CHANGE UP (ref 32–36)
MCV RBC AUTO: 89.7 FL — SIGNIFICANT CHANGE UP (ref 80–100)
MCV RBC AUTO: 89.7 FL — SIGNIFICANT CHANGE UP (ref 80–100)
MONOCYTES # BLD AUTO: 0.83 K/UL — SIGNIFICANT CHANGE UP (ref 0–0.9)
MONOCYTES # BLD AUTO: 0.83 K/UL — SIGNIFICANT CHANGE UP (ref 0–0.9)
MONOCYTES NFR BLD AUTO: 4.3 % — SIGNIFICANT CHANGE UP (ref 2–14)
MONOCYTES NFR BLD AUTO: 4.3 % — SIGNIFICANT CHANGE UP (ref 2–14)
NEUTROPHILS # BLD AUTO: 16.08 K/UL — HIGH (ref 1.8–7.4)
NEUTROPHILS # BLD AUTO: 16.08 K/UL — HIGH (ref 1.8–7.4)
NEUTROPHILS NFR BLD AUTO: 82.4 % — HIGH (ref 43–77)
NEUTROPHILS NFR BLD AUTO: 82.4 % — HIGH (ref 43–77)
NITRITE UR-MCNC: NEGATIVE — SIGNIFICANT CHANGE UP
NITRITE UR-MCNC: NEGATIVE — SIGNIFICANT CHANGE UP
NRBC # BLD: 0 /100 WBCS — SIGNIFICANT CHANGE UP (ref 0–0)
NRBC # BLD: 0 /100 WBCS — SIGNIFICANT CHANGE UP (ref 0–0)
PH UR: 5.5 — SIGNIFICANT CHANGE UP (ref 5–8)
PH UR: 5.5 — SIGNIFICANT CHANGE UP (ref 5–8)
PLATELET # BLD AUTO: 436 K/UL — HIGH (ref 150–400)
PLATELET # BLD AUTO: 436 K/UL — HIGH (ref 150–400)
POTASSIUM SERPL-MCNC: 4.5 MMOL/L — SIGNIFICANT CHANGE UP (ref 3.5–5.3)
POTASSIUM SERPL-MCNC: 4.5 MMOL/L — SIGNIFICANT CHANGE UP (ref 3.5–5.3)
POTASSIUM SERPL-SCNC: 4.5 MMOL/L — SIGNIFICANT CHANGE UP (ref 3.5–5.3)
POTASSIUM SERPL-SCNC: 4.5 MMOL/L — SIGNIFICANT CHANGE UP (ref 3.5–5.3)
PROT SERPL-MCNC: 8.3 G/DL — SIGNIFICANT CHANGE UP (ref 6–8.3)
PROT SERPL-MCNC: 8.3 G/DL — SIGNIFICANT CHANGE UP (ref 6–8.3)
PROT UR-MCNC: NEGATIVE MG/DL — SIGNIFICANT CHANGE UP
PROT UR-MCNC: NEGATIVE MG/DL — SIGNIFICANT CHANGE UP
RBC # BLD: 2.72 M/UL — LOW (ref 3.8–5.2)
RBC # FLD: 18.1 % — HIGH (ref 10.3–14.5)
RBC # FLD: 18.1 % — HIGH (ref 10.3–14.5)
RBC CASTS # UR COMP ASSIST: 3 /HPF — SIGNIFICANT CHANGE UP (ref 0–4)
RBC CASTS # UR COMP ASSIST: 3 /HPF — SIGNIFICANT CHANGE UP (ref 0–4)
RETICS #: 344.9 K/UL — HIGH (ref 25–125)
RETICS #: 344.9 K/UL — HIGH (ref 25–125)
RETICS/RBC NFR: 12.7 % — HIGH (ref 0.5–2.5)
RETICS/RBC NFR: 12.7 % — HIGH (ref 0.5–2.5)
RSV RNA NPH QL NAA+NON-PROBE: SIGNIFICANT CHANGE UP
RSV RNA NPH QL NAA+NON-PROBE: SIGNIFICANT CHANGE UP
SARS-COV-2 RNA SPEC QL NAA+PROBE: SIGNIFICANT CHANGE UP
SARS-COV-2 RNA SPEC QL NAA+PROBE: SIGNIFICANT CHANGE UP
SODIUM SERPL-SCNC: 139 MMOL/L — SIGNIFICANT CHANGE UP (ref 135–145)
SODIUM SERPL-SCNC: 139 MMOL/L — SIGNIFICANT CHANGE UP (ref 135–145)
SP GR SPEC: 1.01 — SIGNIFICANT CHANGE UP (ref 1–1.03)
SP GR SPEC: 1.01 — SIGNIFICANT CHANGE UP (ref 1–1.03)
SQUAMOUS # UR AUTO: 1 /HPF — SIGNIFICANT CHANGE UP (ref 0–5)
SQUAMOUS # UR AUTO: 1 /HPF — SIGNIFICANT CHANGE UP (ref 0–5)
UROBILINOGEN FLD QL: 0.2 MG/DL — SIGNIFICANT CHANGE UP (ref 0.2–1)
UROBILINOGEN FLD QL: 0.2 MG/DL — SIGNIFICANT CHANGE UP (ref 0.2–1)
WBC # BLD: 19.49 K/UL — HIGH (ref 3.8–10.5)
WBC # BLD: 19.49 K/UL — HIGH (ref 3.8–10.5)
WBC # FLD AUTO: 19.49 K/UL — HIGH (ref 3.8–10.5)
WBC # FLD AUTO: 19.49 K/UL — HIGH (ref 3.8–10.5)
WBC UR QL: 2 /HPF — SIGNIFICANT CHANGE UP (ref 0–5)
WBC UR QL: 2 /HPF — SIGNIFICANT CHANGE UP (ref 0–5)

## 2023-11-19 PROCEDURE — 99285 EMERGENCY DEPT VISIT HI MDM: CPT

## 2023-11-19 PROCEDURE — 99223 1ST HOSP IP/OBS HIGH 75: CPT

## 2023-11-19 PROCEDURE — 71045 X-RAY EXAM CHEST 1 VIEW: CPT | Mod: 26

## 2023-11-19 RX ORDER — SODIUM CHLORIDE 9 MG/ML
2000 INJECTION INTRAMUSCULAR; INTRAVENOUS; SUBCUTANEOUS ONCE
Refills: 0 | Status: COMPLETED | OUTPATIENT
Start: 2023-11-19 | End: 2023-11-19

## 2023-11-19 RX ORDER — KETOROLAC TROMETHAMINE 30 MG/ML
30 SYRINGE (ML) INJECTION ONCE
Refills: 0 | Status: DISCONTINUED | OUTPATIENT
Start: 2023-11-19 | End: 2023-11-19

## 2023-11-19 RX ORDER — ACETAMINOPHEN 500 MG
1000 TABLET ORAL ONCE
Refills: 0 | Status: COMPLETED | OUTPATIENT
Start: 2023-11-19 | End: 2023-11-19

## 2023-11-19 RX ORDER — MORPHINE SULFATE 50 MG/1
8 CAPSULE, EXTENDED RELEASE ORAL ONCE
Refills: 0 | Status: DISCONTINUED | OUTPATIENT
Start: 2023-11-19 | End: 2023-11-19

## 2023-11-19 RX ADMIN — SODIUM CHLORIDE 2000 MILLILITER(S): 9 INJECTION INTRAMUSCULAR; INTRAVENOUS; SUBCUTANEOUS at 16:10

## 2023-11-19 RX ADMIN — MORPHINE SULFATE 8 MILLIGRAM(S): 50 CAPSULE, EXTENDED RELEASE ORAL at 16:11

## 2023-11-19 RX ADMIN — MORPHINE SULFATE 8 MILLIGRAM(S): 50 CAPSULE, EXTENDED RELEASE ORAL at 16:26

## 2023-11-19 RX ADMIN — Medication 400 MILLIGRAM(S): at 23:33

## 2023-11-19 RX ADMIN — Medication 30 MILLIGRAM(S): at 21:35

## 2023-11-19 NOTE — ED PROVIDER NOTE - CLINICAL SUMMARY MEDICAL DECISION MAKING FREE TEXT BOX
MDM  Impression:  acute pain in patient with sickle cell anemia whose pain syndrome is c/w prior crisis pain.  H&P at this time inconsistent with an alternative etiology such as acute chest syndrome, ACS, pericarditis, myocarditis, pulmonary embolism, pneumothorax, pneumonia, zoster, or esophageal perforation.    Plan:  IVF, pain meds, cbc, reticulocyte count, LDH, haptoglobin, reassess.     Primary Hematologist is at [facility]

## 2023-11-19 NOTE — H&P ADULT - HISTORY OF PRESENT ILLNESS
38F c hx SS disease, pw 3 days of worsening b/l knee and shoulder pain.    Pt denies fevers, chills, CP, SOB, URI symptoms, GI symptoms, urinary symptoms. Pt states she does not often require hospitalize for her sickle crises, but usually goes to [facility] when she does. Pt states the pain is like her prior crises.    Reference #: [id number]  PDI	Current Rx	Drug Type	Rx Written	Rx Dispensed	Drug	Quantity	Days Supply	Prescriber Name	Prescriber SEVEN #	Payment Method	Dispenser  A	N	O	10/26/2023	10/27/2023	oxycodone-acetaminophen 5-325 mg tablet	20	5	Ed Cuadra	[id number]	Medicaid	[id number]

## 2023-11-19 NOTE — ED ADULT NURSE NOTE - OBJECTIVE STATEMENT
38 Year old female via triage comes in HX of SCC in crisis Pt takes percocet at home with no relief Pt endorses b/L knee pain and shoulder pain.  Pt took an edible 40-50 mg at 1000 that helped a little Pt denies chest pain no n/v/d or Fever IVL placed and bloods sent as ordered Tank

## 2023-11-19 NOTE — H&P ADULT - NSHPSOCIALHISTORY_GEN_ALL_CORE
Social History:    Marital Status: (  ) , ( x ) Single, (  ) , (  ) , (  )   # of Children: 2  Lives with: (  ) alone, ( x ) children, (  ) spouse, (  ) parents, (  ) siblings, (  ) friends, ( x ) other: girlfriend  Occupation:     Substance Use/Illicit Drugs: (  ) never used vs other:   Tobacco Usage: (  ) never smoked, ( x ) former smoker, (  ) current smoker and Total Pack-Years: 5 pk yrs  Last Alcohol Usage/Frequency/Amount/Withdrawal/Hx of Abuse:  last drink 2 days ago  Foreign travel:   Animal exposure:

## 2023-11-19 NOTE — H&P ADULT - NSHPREVIEWOFSYSTEMS_GEN_ALL_CORE
REVIEW OF SYSTEMS:  CONSTITUTIONAL: No weakness. No fevers. No chills. No rigors. No poor appetite.  EYES: No blurry or double vision. No eye pain.  ENT: No hearing difficulty. No sore throat. No Sinusitis/rhinorrhea.   NECK: No pain. No stiffness/rigidity.  CARDIAC: No chest pain. No palpitations. No lightheadedness. No syncope.  RESPIRATORY: No cough. No SOB.   GASTROINTESTINAL: No abdominal pain. No nausea. No vomiting. No diarrhea. No constipation.   GENITOURINARY: No dysuria. No frequency. No oliguria.  NEUROLOGICAL: No numbness/tingling. No focal weakness. No headache. No unsteady gait.  BACK: No back pain. No flank pain.  EXTREMITIES: No lower extremity edema. Full ROM. +b/l shoulder and knee joint pain.  SKIN: No rashes. No itching. No other lesions.  PSYCHIATRIC: No depression. No anxiety.   ALLERGIC: No lip swelling. No hives.  All other review of systems is negative unless indicated above.  Unless indicated above, unable to assess ROS 2/2

## 2023-11-19 NOTE — H&P ADULT - NSHPPHYSICALEXAM_GEN_ALL_CORE
PHYSICAL EXAM:   GENERAL: Alert. Not confused. No acute distress. Not thin. Not cachectic. Not obese.  HEAD:  Atraumatic. Normocephalic.  EYES: EOMI. PERRLA. Normal conjunctiva/sclera.  ENT: Neck supple. No JVD. Moist oral mucosa. Not edentulous. No thrush.  LYMPH: Normal supraclavicular/cervical lymph nodes.   CARDIAC: Not tachy, Not marilyn. Regular rhythm. Not irregularly irregular. S1. S2. No murmur. No rub. No distant heart sounds.  LUNG/CHEST: CTAB. BS equal bilaterally. No wheezes. No rales. No rhonchi.  ABDOMEN: Soft. No tenderness. No distension. No fluid wave. Normal bowel sounds.  BACK: No midline/vertebral tenderness. No flank tenderness.  VASCULAR: +2 b/l radial or ulnar pulses. Palpable DP pulses.  EXTREMITIES:  No clubbing. No cyanosis. No edema. Moving all 4.  NEUROLOGY: A&Ox3. Non-focal exam. Cranial nerves intact. Normal speech. Sensation intact.  PSYCH: Normal behavior. Normal affect.  SKIN: No jaundice. No erythema. No rash/lesion.  ICU Vital Signs Last 24 Hrs  T(C): 37 (20 Nov 2023 00:53), Max: 37.4 (19 Nov 2023 14:42)  T(F): 98.6 (20 Nov 2023 00:53), Max: 99.3 (19 Nov 2023 14:42)  HR: 78 (20 Nov 2023 00:53) (78 - 86)  BP: 106/64 (20 Nov 2023 00:53) (102/67 - 106/64)  BP(mean): 69 (20 Nov 2023 00:53) (69 - 73)  ABP: --  ABP(mean): --  RR: 18 (20 Nov 2023 00:53) (15 - 18)  SpO2: 95% (20 Nov 2023 00:53) (95% - 100%)    O2 Parameters below as of 20 Nov 2023 00:53  Patient On (Oxygen Delivery Method): nasal cannula  O2 Flow (L/min): 2        I&O's Summary

## 2023-11-19 NOTE — ED ADULT TRIAGE NOTE - CHIEF COMPLAINT QUOTE
sickle cell crisis, knee and shoulder pain since 5am  took percocet at 5 and 11:30, took tylenol unknown time took an edible at 1330

## 2023-11-19 NOTE — ED PROVIDER NOTE - ATTENDING CONTRIBUTION TO CARE
MD Hernandez:  patient seen and evaluated with the PA.  I was present for key portions of the History & Physical, and I agree with the Impression & Plan.    MD Hernandez:  39 yo F c/o sickle-cell pain.   Location:  bilateral knees, bilateral shoulders; c/w her sickle cell pain.  Duration:  3d.  Quality:  like prior sickle cell crisis.    Associated Sx:  No CP/back pain/SOB.  No weakness/numbness, no abdominal pain, & no fever/chills.      VS: wnl.  Physical Exam: adult F, uncomfortable-appearing, NCAT, PERRL, EOMI, neck supple, CTA B, RRR, Abd: s/nd/nt, Ext: no edema. Neuro:  AAOx3, moving all 4 extremities equally, normal gait.       MDM  Impression:  acute pain in patient with sickle cell anemia whose pain syndrome is c/w prior crisis pain.  H&P at this time inconsistent with an alternative etiology such as acute chest syndrome, ACS, pericarditis, myocarditis, pulmonary embolism, pneumothorax, pneumonia, zoster, or esophageal perforation.    Plan:  IVF, pain meds, cbc, reticulocyte count, LDH, haptoglobin, reassess.     Primary Hematologist is at [facility]

## 2023-11-19 NOTE — ED ADULT NURSE REASSESSMENT NOTE - NS ED NURSE REASSESS COMMENT FT1
1626 pt gien pain meds as ordered and fluids as ordered Pt placed on 2l nc at this time room air pox 94% will continue to monitor MpuleoRN

## 2023-11-19 NOTE — H&P ADULT - NSHPLABSRESULTS_GEN_ALL_CORE
Personally reviewed old records.  Personally reviewed labs.  Personally reviewed imaging.                          8.6    19.49 )-----------( 436      ( 2023 16:16 )             24.4       11-    139  |  102  |  10  ----------------------------<  120<H>  4.5   |  22  |  0.63    Ca    9.3      2023 16:16    TPro  8.3  /  Alb  4.5  /  TBili  2.0<H>  /  DBili  x   /  AST  28  /  ALT  13  /  AlkPhos  117  11-            LIVER FUNCTIONS - ( 2023 16:16 )  Alb: 4.5 g/dL / Pro: 8.3 g/dL / ALK PHOS: 117 U/L / ALT: 13 U/L / AST: 28 U/L / GGT: x                 Urinalysis Basic - ( 2023 23:41 )    Color: Yellow / Appearance: Clear / S.014 / pH: x  Gluc: x / Ketone: Negative mg/dL  / Bili: Negative / Urobili: 0.2 mg/dL   Blood: x / Protein: Negative mg/dL / Nitrite: Negative   Leuk Esterase: Negative / RBC: 3 /HPF / WBC 2 /HPF   Sq Epi: x / Non Sq Epi: 1 /HPF / Bacteria: Occasional /HPF

## 2023-11-19 NOTE — ED PROVIDER NOTE - OBJECTIVE STATEMENT
38-year-old female with past medical history of sickle cell presenting with joint pain.  Patient endorsing bilateral knee, bilateral shoulder pain for the past 3 days.  Pain worsening over the past 24 hours.  Symptoms feel exactly like previous sickle cell pain.  Patient taking oxycodone at home without relief.  Patient currently denies fever/chills, chest pain, shortness of breath, abdominal pain, nausea, vomiting, diarrhea, dysuria.

## 2023-11-19 NOTE — ED PROVIDER NOTE - PHYSICAL EXAMINATION
Gen: AAO x 3, appears uncomfortable  Skin: No rashes or lesions  HEENT: NC/AT, PERRLA, EOMI, MMM  Resp: unlabored CTAB  Cardiac: rrr s1s2, no murmurs, rubs or gallops  GI: ND, +BS, Soft, NT  Ext: no pedal edema, FROM in all extremities  Neuro: no focal deficits

## 2023-11-19 NOTE — ED PROVIDER NOTE - NS ED ATTENDING STATEMENT MOD
I have seen and examined this patient and fully participated in the care of this patient as the teaching attending.  The service was shared with the SAYDA.  I reviewed and verified the documentation and independently performed the documented:

## 2023-11-20 DIAGNOSIS — R65.10 SYSTEMIC INFLAMMATORY RESPONSE SYNDROME (SIRS) OF NON-INFECTIOUS ORIGIN WITHOUT ACUTE ORGAN DYSFUNCTION: ICD-10-CM

## 2023-11-20 DIAGNOSIS — D57.00 HB-SS DISEASE WITH CRISIS, UNSPECIFIED: ICD-10-CM

## 2023-11-20 PROBLEM — D57.1 SICKLE-CELL DISEASE WITHOUT CRISIS: Chronic | Status: ACTIVE | Noted: 2019-09-03

## 2023-11-20 LAB
ALBUMIN SERPL ELPH-MCNC: 3.7 G/DL — SIGNIFICANT CHANGE UP (ref 3.3–5)
ALBUMIN SERPL ELPH-MCNC: 3.7 G/DL — SIGNIFICANT CHANGE UP (ref 3.3–5)
ALP SERPL-CCNC: 99 U/L — SIGNIFICANT CHANGE UP (ref 40–120)
ALP SERPL-CCNC: 99 U/L — SIGNIFICANT CHANGE UP (ref 40–120)
ALT FLD-CCNC: 10 U/L — SIGNIFICANT CHANGE UP (ref 10–45)
ALT FLD-CCNC: 10 U/L — SIGNIFICANT CHANGE UP (ref 10–45)
ANION GAP SERPL CALC-SCNC: 12 MMOL/L — SIGNIFICANT CHANGE UP (ref 5–17)
ANION GAP SERPL CALC-SCNC: 12 MMOL/L — SIGNIFICANT CHANGE UP (ref 5–17)
ANISOCYTOSIS BLD QL: SLIGHT — SIGNIFICANT CHANGE UP
ANISOCYTOSIS BLD QL: SLIGHT — SIGNIFICANT CHANGE UP
APTT BLD: 24.8 SEC — SIGNIFICANT CHANGE UP (ref 24.5–35.6)
APTT BLD: 24.8 SEC — SIGNIFICANT CHANGE UP (ref 24.5–35.6)
AST SERPL-CCNC: 20 U/L — SIGNIFICANT CHANGE UP (ref 10–40)
AST SERPL-CCNC: 20 U/L — SIGNIFICANT CHANGE UP (ref 10–40)
BASOPHILS # BLD AUTO: 0 K/UL — SIGNIFICANT CHANGE UP (ref 0–0.2)
BASOPHILS # BLD AUTO: 0 K/UL — SIGNIFICANT CHANGE UP (ref 0–0.2)
BASOPHILS NFR BLD AUTO: 0 % — SIGNIFICANT CHANGE UP (ref 0–2)
BASOPHILS NFR BLD AUTO: 0 % — SIGNIFICANT CHANGE UP (ref 0–2)
BILIRUB SERPL-MCNC: 1.4 MG/DL — HIGH (ref 0.2–1.2)
BILIRUB SERPL-MCNC: 1.4 MG/DL — HIGH (ref 0.2–1.2)
BUN SERPL-MCNC: 9 MG/DL — SIGNIFICANT CHANGE UP (ref 7–23)
BUN SERPL-MCNC: 9 MG/DL — SIGNIFICANT CHANGE UP (ref 7–23)
CALCIUM SERPL-MCNC: 8.7 MG/DL — SIGNIFICANT CHANGE UP (ref 8.4–10.5)
CALCIUM SERPL-MCNC: 8.7 MG/DL — SIGNIFICANT CHANGE UP (ref 8.4–10.5)
CHLORIDE SERPL-SCNC: 108 MMOL/L — SIGNIFICANT CHANGE UP (ref 96–108)
CHLORIDE SERPL-SCNC: 108 MMOL/L — SIGNIFICANT CHANGE UP (ref 96–108)
CO2 SERPL-SCNC: 23 MMOL/L — SIGNIFICANT CHANGE UP (ref 22–31)
CO2 SERPL-SCNC: 23 MMOL/L — SIGNIFICANT CHANGE UP (ref 22–31)
CREAT SERPL-MCNC: 0.66 MG/DL — SIGNIFICANT CHANGE UP (ref 0.5–1.3)
CREAT SERPL-MCNC: 0.66 MG/DL — SIGNIFICANT CHANGE UP (ref 0.5–1.3)
DACRYOCYTES BLD QL SMEAR: SLIGHT — SIGNIFICANT CHANGE UP
DACRYOCYTES BLD QL SMEAR: SLIGHT — SIGNIFICANT CHANGE UP
EGFR: 114 ML/MIN/1.73M2 — SIGNIFICANT CHANGE UP
EGFR: 114 ML/MIN/1.73M2 — SIGNIFICANT CHANGE UP
ELLIPTOCYTES BLD QL SMEAR: SLIGHT — SIGNIFICANT CHANGE UP
ELLIPTOCYTES BLD QL SMEAR: SLIGHT — SIGNIFICANT CHANGE UP
EOSINOPHIL # BLD AUTO: 0.12 K/UL — SIGNIFICANT CHANGE UP (ref 0–0.5)
EOSINOPHIL # BLD AUTO: 0.12 K/UL — SIGNIFICANT CHANGE UP (ref 0–0.5)
EOSINOPHIL NFR BLD AUTO: 0.9 % — SIGNIFICANT CHANGE UP (ref 0–6)
EOSINOPHIL NFR BLD AUTO: 0.9 % — SIGNIFICANT CHANGE UP (ref 0–6)
FERRITIN SERPL-MCNC: 272 NG/ML — HIGH (ref 15–150)
FERRITIN SERPL-MCNC: 272 NG/ML — HIGH (ref 15–150)
GLUCOSE SERPL-MCNC: 122 MG/DL — HIGH (ref 70–99)
GLUCOSE SERPL-MCNC: 122 MG/DL — HIGH (ref 70–99)
HAPTOGLOB SERPL-MCNC: <20 MG/DL — LOW (ref 34–200)
HAPTOGLOB SERPL-MCNC: <20 MG/DL — LOW (ref 34–200)
HCT VFR BLD CALC: 22.1 % — LOW (ref 34.5–45)
HCT VFR BLD CALC: 22.1 % — LOW (ref 34.5–45)
HGB BLD-MCNC: 7.6 G/DL — LOW (ref 11.5–15.5)
HGB BLD-MCNC: 7.6 G/DL — LOW (ref 11.5–15.5)
HYPOCHROMIA BLD QL: SLIGHT — SIGNIFICANT CHANGE UP
HYPOCHROMIA BLD QL: SLIGHT — SIGNIFICANT CHANGE UP
INR BLD: 1.25 RATIO — HIGH (ref 0.85–1.18)
INR BLD: 1.25 RATIO — HIGH (ref 0.85–1.18)
IRON SATN MFR SERPL: 16 % — SIGNIFICANT CHANGE UP (ref 14–50)
IRON SATN MFR SERPL: 16 % — SIGNIFICANT CHANGE UP (ref 14–50)
IRON SATN MFR SERPL: 42 UG/DL — SIGNIFICANT CHANGE UP (ref 30–160)
IRON SATN MFR SERPL: 42 UG/DL — SIGNIFICANT CHANGE UP (ref 30–160)
LDH SERPL L TO P-CCNC: 259 U/L — HIGH (ref 50–242)
LDH SERPL L TO P-CCNC: 259 U/L — HIGH (ref 50–242)
LYMPHOCYTES # BLD AUTO: 37.2 % — SIGNIFICANT CHANGE UP (ref 13–44)
LYMPHOCYTES # BLD AUTO: 37.2 % — SIGNIFICANT CHANGE UP (ref 13–44)
LYMPHOCYTES # BLD AUTO: 4.89 K/UL — HIGH (ref 1–3.3)
LYMPHOCYTES # BLD AUTO: 4.89 K/UL — HIGH (ref 1–3.3)
MACROCYTES BLD QL: SLIGHT — SIGNIFICANT CHANGE UP
MACROCYTES BLD QL: SLIGHT — SIGNIFICANT CHANGE UP
MAGNESIUM SERPL-MCNC: 2.2 MG/DL — SIGNIFICANT CHANGE UP (ref 1.6–2.6)
MAGNESIUM SERPL-MCNC: 2.2 MG/DL — SIGNIFICANT CHANGE UP (ref 1.6–2.6)
MANUAL SMEAR VERIFICATION: SIGNIFICANT CHANGE UP
MANUAL SMEAR VERIFICATION: SIGNIFICANT CHANGE UP
MCHC RBC-ENTMCNC: 31 PG — SIGNIFICANT CHANGE UP (ref 27–34)
MCHC RBC-ENTMCNC: 31 PG — SIGNIFICANT CHANGE UP (ref 27–34)
MCHC RBC-ENTMCNC: 34.4 GM/DL — SIGNIFICANT CHANGE UP (ref 32–36)
MCHC RBC-ENTMCNC: 34.4 GM/DL — SIGNIFICANT CHANGE UP (ref 32–36)
MCV RBC AUTO: 90.2 FL — SIGNIFICANT CHANGE UP (ref 80–100)
MCV RBC AUTO: 90.2 FL — SIGNIFICANT CHANGE UP (ref 80–100)
MICROCYTES BLD QL: SLIGHT — SIGNIFICANT CHANGE UP
MICROCYTES BLD QL: SLIGHT — SIGNIFICANT CHANGE UP
MONOCYTES # BLD AUTO: 0.46 K/UL — SIGNIFICANT CHANGE UP (ref 0–0.9)
MONOCYTES # BLD AUTO: 0.46 K/UL — SIGNIFICANT CHANGE UP (ref 0–0.9)
MONOCYTES NFR BLD AUTO: 3.5 % — SIGNIFICANT CHANGE UP (ref 2–14)
MONOCYTES NFR BLD AUTO: 3.5 % — SIGNIFICANT CHANGE UP (ref 2–14)
NEUTROPHILS # BLD AUTO: 7.68 K/UL — HIGH (ref 1.8–7.4)
NEUTROPHILS # BLD AUTO: 7.68 K/UL — HIGH (ref 1.8–7.4)
NEUTROPHILS NFR BLD AUTO: 58.4 % — SIGNIFICANT CHANGE UP (ref 43–77)
NEUTROPHILS NFR BLD AUTO: 58.4 % — SIGNIFICANT CHANGE UP (ref 43–77)
NRBC # BLD: 3 /100 — HIGH (ref 0–0)
NRBC # BLD: 3 /100 — HIGH (ref 0–0)
OVALOCYTES BLD QL SMEAR: SLIGHT — SIGNIFICANT CHANGE UP
OVALOCYTES BLD QL SMEAR: SLIGHT — SIGNIFICANT CHANGE UP
PHOSPHATE SERPL-MCNC: 3.4 MG/DL — SIGNIFICANT CHANGE UP (ref 2.5–4.5)
PHOSPHATE SERPL-MCNC: 3.4 MG/DL — SIGNIFICANT CHANGE UP (ref 2.5–4.5)
PLAT MORPH BLD: NORMAL — SIGNIFICANT CHANGE UP
PLAT MORPH BLD: NORMAL — SIGNIFICANT CHANGE UP
PLATELET # BLD AUTO: 365 K/UL — SIGNIFICANT CHANGE UP (ref 150–400)
PLATELET # BLD AUTO: 365 K/UL — SIGNIFICANT CHANGE UP (ref 150–400)
POLYCHROMASIA BLD QL SMEAR: SLIGHT — SIGNIFICANT CHANGE UP
POLYCHROMASIA BLD QL SMEAR: SLIGHT — SIGNIFICANT CHANGE UP
POTASSIUM SERPL-MCNC: 3.7 MMOL/L — SIGNIFICANT CHANGE UP (ref 3.5–5.3)
POTASSIUM SERPL-MCNC: 3.7 MMOL/L — SIGNIFICANT CHANGE UP (ref 3.5–5.3)
POTASSIUM SERPL-SCNC: 3.7 MMOL/L — SIGNIFICANT CHANGE UP (ref 3.5–5.3)
POTASSIUM SERPL-SCNC: 3.7 MMOL/L — SIGNIFICANT CHANGE UP (ref 3.5–5.3)
PROT SERPL-MCNC: 7 G/DL — SIGNIFICANT CHANGE UP (ref 6–8.3)
PROT SERPL-MCNC: 7 G/DL — SIGNIFICANT CHANGE UP (ref 6–8.3)
PROTHROM AB SERPL-ACNC: 13.7 SEC — HIGH (ref 9.5–13)
PROTHROM AB SERPL-ACNC: 13.7 SEC — HIGH (ref 9.5–13)
RBC # BLD: 2.45 M/UL — LOW (ref 3.8–5.2)
RBC # FLD: 17.2 % — HIGH (ref 10.3–14.5)
RBC # FLD: 17.2 % — HIGH (ref 10.3–14.5)
RBC BLD AUTO: ABNORMAL
RBC BLD AUTO: ABNORMAL
RETICS #: 305.3 K/UL — HIGH (ref 25–125)
RETICS #: 305.3 K/UL — HIGH (ref 25–125)
RETICS/RBC NFR: 12.5 % — HIGH (ref 0.5–2.5)
RETICS/RBC NFR: 12.5 % — HIGH (ref 0.5–2.5)
SICKLE CELLS BLD QL SMEAR: SLIGHT — SIGNIFICANT CHANGE UP
SICKLE CELLS BLD QL SMEAR: SLIGHT — SIGNIFICANT CHANGE UP
SODIUM SERPL-SCNC: 143 MMOL/L — SIGNIFICANT CHANGE UP (ref 135–145)
SODIUM SERPL-SCNC: 143 MMOL/L — SIGNIFICANT CHANGE UP (ref 135–145)
TARGETS BLD QL SMEAR: SLIGHT — SIGNIFICANT CHANGE UP
TARGETS BLD QL SMEAR: SLIGHT — SIGNIFICANT CHANGE UP
TIBC SERPL-MCNC: 268 UG/DL — SIGNIFICANT CHANGE UP (ref 220–430)
TIBC SERPL-MCNC: 268 UG/DL — SIGNIFICANT CHANGE UP (ref 220–430)
TSH SERPL-MCNC: 0.86 UIU/ML — SIGNIFICANT CHANGE UP (ref 0.27–4.2)
TSH SERPL-MCNC: 0.86 UIU/ML — SIGNIFICANT CHANGE UP (ref 0.27–4.2)
UIBC SERPL-MCNC: 226 UG/DL — SIGNIFICANT CHANGE UP (ref 110–370)
UIBC SERPL-MCNC: 226 UG/DL — SIGNIFICANT CHANGE UP (ref 110–370)
VIT B12 SERPL-MCNC: 205 PG/ML — LOW (ref 232–1245)
VIT B12 SERPL-MCNC: 205 PG/ML — LOW (ref 232–1245)
WBC # BLD: 13.15 K/UL — HIGH (ref 3.8–10.5)
WBC # BLD: 13.15 K/UL — HIGH (ref 3.8–10.5)
WBC # FLD AUTO: 13.15 K/UL — HIGH (ref 3.8–10.5)
WBC # FLD AUTO: 13.15 K/UL — HIGH (ref 3.8–10.5)

## 2023-11-20 PROCEDURE — 99233 SBSQ HOSP IP/OBS HIGH 50: CPT

## 2023-11-20 RX ORDER — KETOROLAC TROMETHAMINE 30 MG/ML
30 SYRINGE (ML) INJECTION ONCE
Refills: 0 | Status: DISCONTINUED | OUTPATIENT
Start: 2023-11-20 | End: 2023-11-20

## 2023-11-20 RX ORDER — OXYCODONE AND ACETAMINOPHEN 5; 325 MG/1; MG/1
1 TABLET ORAL
Refills: 0 | DISCHARGE

## 2023-11-20 RX ORDER — SODIUM CHLORIDE 9 MG/ML
1000 INJECTION, SOLUTION INTRAVENOUS
Refills: 0 | Status: DISCONTINUED | OUTPATIENT
Start: 2023-11-20 | End: 2023-11-21

## 2023-11-20 RX ORDER — POLYETHYLENE GLYCOL 3350 17 G/17G
17 POWDER, FOR SOLUTION ORAL DAILY
Refills: 0 | Status: DISCONTINUED | OUTPATIENT
Start: 2023-11-20 | End: 2023-11-21

## 2023-11-20 RX ORDER — FOLIC ACID 0.8 MG
1 TABLET ORAL DAILY
Refills: 0 | Status: DISCONTINUED | OUTPATIENT
Start: 2023-11-20 | End: 2023-11-21

## 2023-11-20 RX ORDER — MORPHINE SULFATE 50 MG/1
4 CAPSULE, EXTENDED RELEASE ORAL EVERY 4 HOURS
Refills: 0 | Status: DISCONTINUED | OUTPATIENT
Start: 2023-11-20 | End: 2023-11-21

## 2023-11-20 RX ORDER — SENNA PLUS 8.6 MG/1
2 TABLET ORAL AT BEDTIME
Refills: 0 | Status: DISCONTINUED | OUTPATIENT
Start: 2023-11-20 | End: 2023-11-21

## 2023-11-20 RX ADMIN — Medication 30 MILLIGRAM(S): at 11:41

## 2023-11-20 RX ADMIN — MORPHINE SULFATE 4 MILLIGRAM(S): 50 CAPSULE, EXTENDED RELEASE ORAL at 06:05

## 2023-11-20 RX ADMIN — Medication 1 MILLIGRAM(S): at 11:44

## 2023-11-20 RX ADMIN — SODIUM CHLORIDE 75 MILLILITER(S): 9 INJECTION, SOLUTION INTRAVENOUS at 11:43

## 2023-11-20 RX ADMIN — MORPHINE SULFATE 4 MILLIGRAM(S): 50 CAPSULE, EXTENDED RELEASE ORAL at 17:53

## 2023-11-20 RX ADMIN — Medication 30 MILLIGRAM(S): at 11:11

## 2023-11-20 NOTE — PROGRESS NOTE ADULT - PROBLEM SELECTOR PLAN 1
- c/w IVF  - morphine 4q4h iv prn while in ED. PCA pump when on floors.   - hemolysis labs  - bowel regimen

## 2023-11-21 VITALS
OXYGEN SATURATION: 100 % | TEMPERATURE: 99 F | DIASTOLIC BLOOD PRESSURE: 72 MMHG | RESPIRATION RATE: 18 BRPM | HEART RATE: 78 BPM | SYSTOLIC BLOOD PRESSURE: 113 MMHG

## 2023-11-21 LAB
ALBUMIN SERPL ELPH-MCNC: 3.6 G/DL — SIGNIFICANT CHANGE UP (ref 3.3–5)
ALBUMIN SERPL ELPH-MCNC: 3.6 G/DL — SIGNIFICANT CHANGE UP (ref 3.3–5)
ALP SERPL-CCNC: 99 U/L — SIGNIFICANT CHANGE UP (ref 40–120)
ALP SERPL-CCNC: 99 U/L — SIGNIFICANT CHANGE UP (ref 40–120)
ALT FLD-CCNC: 9 U/L — LOW (ref 10–45)
ALT FLD-CCNC: 9 U/L — LOW (ref 10–45)
ANION GAP SERPL CALC-SCNC: 11 MMOL/L — SIGNIFICANT CHANGE UP (ref 5–17)
ANION GAP SERPL CALC-SCNC: 11 MMOL/L — SIGNIFICANT CHANGE UP (ref 5–17)
AST SERPL-CCNC: 16 U/L — SIGNIFICANT CHANGE UP (ref 10–40)
AST SERPL-CCNC: 16 U/L — SIGNIFICANT CHANGE UP (ref 10–40)
BASOPHILS # BLD AUTO: 0.05 K/UL — SIGNIFICANT CHANGE UP (ref 0–0.2)
BASOPHILS # BLD AUTO: 0.05 K/UL — SIGNIFICANT CHANGE UP (ref 0–0.2)
BASOPHILS NFR BLD AUTO: 0.4 % — SIGNIFICANT CHANGE UP (ref 0–2)
BASOPHILS NFR BLD AUTO: 0.4 % — SIGNIFICANT CHANGE UP (ref 0–2)
BILIRUB SERPL-MCNC: 1.8 MG/DL — HIGH (ref 0.2–1.2)
BILIRUB SERPL-MCNC: 1.8 MG/DL — HIGH (ref 0.2–1.2)
BUN SERPL-MCNC: 6 MG/DL — LOW (ref 7–23)
BUN SERPL-MCNC: 6 MG/DL — LOW (ref 7–23)
CALCIUM SERPL-MCNC: 8.5 MG/DL — SIGNIFICANT CHANGE UP (ref 8.4–10.5)
CALCIUM SERPL-MCNC: 8.5 MG/DL — SIGNIFICANT CHANGE UP (ref 8.4–10.5)
CHLORIDE SERPL-SCNC: 105 MMOL/L — SIGNIFICANT CHANGE UP (ref 96–108)
CHLORIDE SERPL-SCNC: 105 MMOL/L — SIGNIFICANT CHANGE UP (ref 96–108)
CO2 SERPL-SCNC: 24 MMOL/L — SIGNIFICANT CHANGE UP (ref 22–31)
CO2 SERPL-SCNC: 24 MMOL/L — SIGNIFICANT CHANGE UP (ref 22–31)
CREAT SERPL-MCNC: 0.57 MG/DL — SIGNIFICANT CHANGE UP (ref 0.5–1.3)
CREAT SERPL-MCNC: 0.57 MG/DL — SIGNIFICANT CHANGE UP (ref 0.5–1.3)
EGFR: 118 ML/MIN/1.73M2 — SIGNIFICANT CHANGE UP
EGFR: 118 ML/MIN/1.73M2 — SIGNIFICANT CHANGE UP
EOSINOPHIL # BLD AUTO: 0.37 K/UL — SIGNIFICANT CHANGE UP (ref 0–0.5)
EOSINOPHIL # BLD AUTO: 0.37 K/UL — SIGNIFICANT CHANGE UP (ref 0–0.5)
EOSINOPHIL NFR BLD AUTO: 2.7 % — SIGNIFICANT CHANGE UP (ref 0–6)
EOSINOPHIL NFR BLD AUTO: 2.7 % — SIGNIFICANT CHANGE UP (ref 0–6)
GLUCOSE SERPL-MCNC: 89 MG/DL — SIGNIFICANT CHANGE UP (ref 70–99)
GLUCOSE SERPL-MCNC: 89 MG/DL — SIGNIFICANT CHANGE UP (ref 70–99)
HAPTOGLOB SERPL-MCNC: 26 MG/DL — LOW (ref 34–200)
HAPTOGLOB SERPL-MCNC: 26 MG/DL — LOW (ref 34–200)
HCT VFR BLD CALC: 21.9 % — LOW (ref 34.5–45)
HCT VFR BLD CALC: 21.9 % — LOW (ref 34.5–45)
HGB BLD-MCNC: 7.6 G/DL — LOW (ref 11.5–15.5)
HGB BLD-MCNC: 7.6 G/DL — LOW (ref 11.5–15.5)
IMM GRANULOCYTES NFR BLD AUTO: 0.6 % — SIGNIFICANT CHANGE UP (ref 0–0.9)
IMM GRANULOCYTES NFR BLD AUTO: 0.6 % — SIGNIFICANT CHANGE UP (ref 0–0.9)
LDH SERPL L TO P-CCNC: 289 U/L — HIGH (ref 50–242)
LDH SERPL L TO P-CCNC: 289 U/L — HIGH (ref 50–242)
LYMPHOCYTES # BLD AUTO: 42.8 % — SIGNIFICANT CHANGE UP (ref 13–44)
LYMPHOCYTES # BLD AUTO: 42.8 % — SIGNIFICANT CHANGE UP (ref 13–44)
LYMPHOCYTES # BLD AUTO: 5.88 K/UL — HIGH (ref 1–3.3)
LYMPHOCYTES # BLD AUTO: 5.88 K/UL — HIGH (ref 1–3.3)
MCHC RBC-ENTMCNC: 31.5 PG — SIGNIFICANT CHANGE UP (ref 27–34)
MCHC RBC-ENTMCNC: 31.5 PG — SIGNIFICANT CHANGE UP (ref 27–34)
MCHC RBC-ENTMCNC: 34.7 GM/DL — SIGNIFICANT CHANGE UP (ref 32–36)
MCHC RBC-ENTMCNC: 34.7 GM/DL — SIGNIFICANT CHANGE UP (ref 32–36)
MCV RBC AUTO: 90.9 FL — SIGNIFICANT CHANGE UP (ref 80–100)
MCV RBC AUTO: 90.9 FL — SIGNIFICANT CHANGE UP (ref 80–100)
MONOCYTES # BLD AUTO: 0.64 K/UL — SIGNIFICANT CHANGE UP (ref 0–0.9)
MONOCYTES # BLD AUTO: 0.64 K/UL — SIGNIFICANT CHANGE UP (ref 0–0.9)
MONOCYTES NFR BLD AUTO: 4.7 % — SIGNIFICANT CHANGE UP (ref 2–14)
MONOCYTES NFR BLD AUTO: 4.7 % — SIGNIFICANT CHANGE UP (ref 2–14)
NEUTROPHILS # BLD AUTO: 6.73 K/UL — SIGNIFICANT CHANGE UP (ref 1.8–7.4)
NEUTROPHILS # BLD AUTO: 6.73 K/UL — SIGNIFICANT CHANGE UP (ref 1.8–7.4)
NEUTROPHILS NFR BLD AUTO: 48.8 % — SIGNIFICANT CHANGE UP (ref 43–77)
NEUTROPHILS NFR BLD AUTO: 48.8 % — SIGNIFICANT CHANGE UP (ref 43–77)
NRBC # BLD: 1 /100 WBCS — HIGH (ref 0–0)
NRBC # BLD: 1 /100 WBCS — HIGH (ref 0–0)
PLATELET # BLD AUTO: 411 K/UL — HIGH (ref 150–400)
PLATELET # BLD AUTO: 411 K/UL — HIGH (ref 150–400)
POTASSIUM SERPL-MCNC: 3.8 MMOL/L — SIGNIFICANT CHANGE UP (ref 3.5–5.3)
POTASSIUM SERPL-MCNC: 3.8 MMOL/L — SIGNIFICANT CHANGE UP (ref 3.5–5.3)
POTASSIUM SERPL-SCNC: 3.8 MMOL/L — SIGNIFICANT CHANGE UP (ref 3.5–5.3)
POTASSIUM SERPL-SCNC: 3.8 MMOL/L — SIGNIFICANT CHANGE UP (ref 3.5–5.3)
PROT SERPL-MCNC: 6.7 G/DL — SIGNIFICANT CHANGE UP (ref 6–8.3)
PROT SERPL-MCNC: 6.7 G/DL — SIGNIFICANT CHANGE UP (ref 6–8.3)
RBC # BLD: 2.41 M/UL — LOW (ref 3.8–5.2)
RBC # BLD: 2.41 M/UL — LOW (ref 3.8–5.2)
RBC # FLD: 18 % — HIGH (ref 10.3–14.5)
RBC # FLD: 18 % — HIGH (ref 10.3–14.5)
SODIUM SERPL-SCNC: 140 MMOL/L — SIGNIFICANT CHANGE UP (ref 135–145)
SODIUM SERPL-SCNC: 140 MMOL/L — SIGNIFICANT CHANGE UP (ref 135–145)
WBC # BLD: 13.75 K/UL — HIGH (ref 3.8–10.5)
WBC # BLD: 13.75 K/UL — HIGH (ref 3.8–10.5)
WBC # FLD AUTO: 13.75 K/UL — HIGH (ref 3.8–10.5)
WBC # FLD AUTO: 13.75 K/UL — HIGH (ref 3.8–10.5)

## 2023-11-21 PROCEDURE — 36415 COLL VENOUS BLD VENIPUNCTURE: CPT

## 2023-11-21 PROCEDURE — 83735 ASSAY OF MAGNESIUM: CPT

## 2023-11-21 PROCEDURE — 82607 VITAMIN B-12: CPT

## 2023-11-21 PROCEDURE — 85025 COMPLETE CBC W/AUTO DIFF WBC: CPT

## 2023-11-21 PROCEDURE — 83615 LACTATE (LD) (LDH) ENZYME: CPT

## 2023-11-21 PROCEDURE — 85610 PROTHROMBIN TIME: CPT

## 2023-11-21 PROCEDURE — 71045 X-RAY EXAM CHEST 1 VIEW: CPT

## 2023-11-21 PROCEDURE — 85045 AUTOMATED RETICULOCYTE COUNT: CPT

## 2023-11-21 PROCEDURE — 83010 ASSAY OF HAPTOGLOBIN QUANT: CPT

## 2023-11-21 PROCEDURE — 83550 IRON BINDING TEST: CPT

## 2023-11-21 PROCEDURE — 84443 ASSAY THYROID STIM HORMONE: CPT

## 2023-11-21 PROCEDURE — 80053 COMPREHEN METABOLIC PANEL: CPT

## 2023-11-21 PROCEDURE — 85730 THROMBOPLASTIN TIME PARTIAL: CPT

## 2023-11-21 PROCEDURE — 99239 HOSP IP/OBS DSCHRG MGMT >30: CPT

## 2023-11-21 PROCEDURE — 84100 ASSAY OF PHOSPHORUS: CPT

## 2023-11-21 PROCEDURE — 96375 TX/PRO/DX INJ NEW DRUG ADDON: CPT

## 2023-11-21 PROCEDURE — 83540 ASSAY OF IRON: CPT

## 2023-11-21 PROCEDURE — 99285 EMERGENCY DEPT VISIT HI MDM: CPT | Mod: 25

## 2023-11-21 PROCEDURE — 96374 THER/PROPH/DIAG INJ IV PUSH: CPT

## 2023-11-21 PROCEDURE — 87637 SARSCOV2&INF A&B&RSV AMP PRB: CPT

## 2023-11-21 PROCEDURE — 82728 ASSAY OF FERRITIN: CPT

## 2023-11-21 PROCEDURE — 81001 URINALYSIS AUTO W/SCOPE: CPT

## 2023-11-21 PROCEDURE — 87040 BLOOD CULTURE FOR BACTERIA: CPT

## 2023-11-21 RX ORDER — OXYCODONE AND ACETAMINOPHEN 5; 325 MG/1; MG/1
1 TABLET ORAL EVERY 4 HOURS
Refills: 0 | Status: DISCONTINUED | OUTPATIENT
Start: 2023-11-21 | End: 2023-11-21

## 2023-11-21 RX ORDER — POLYETHYLENE GLYCOL 3350 17 G/17G
17 POWDER, FOR SOLUTION ORAL
Qty: 0 | Refills: 0 | DISCHARGE
Start: 2023-11-21

## 2023-11-21 RX ADMIN — Medication 1 MILLIGRAM(S): at 11:35

## 2023-11-21 NOTE — DISCHARGE NOTE NURSING/CASE MANAGEMENT/SOCIAL WORK - PATIENT PORTAL LINK FT
You can access the FollowMyHealth Patient Portal offered by [facility] by registering at the following website: [email or web address]. By joining [facility]’s FollowMyHealth portal, you will also be able to view your health information using other applications (apps) compatible with our system.

## 2023-11-21 NOTE — DISCHARGE NOTE PROVIDER - NSDCMRMEDTOKEN_GEN_ALL_CORE_FT
folic acid 1 mg oral tablet: 1 tab(s) orally once a day  ketorolac 10 mg oral tablet: 1 tab(s) orally every 8 hours, As Needed   Percocet 5 mg-325 mg oral tablet: 1 tab(s) orally 4 times a day as needed for pain  senna oral tablet: 2 tab(s) orally once a day (at bedtime) as needed for constipation    folic acid 1 mg oral tablet: 1 tab(s) orally once a day  Percocet 5 mg-325 mg oral tablet: 1 tab(s) orally 4 times a day as needed for pain  polyethylene glycol 3350 oral powder for reconstitution: 17 gram(s) orally once a day  senna oral tablet: 2 tab(s) orally once a day (at bedtime) as needed for constipation

## 2023-11-21 NOTE — DISCHARGE NOTE PROVIDER - CARE PROVIDER_API CALL
PANCHO HALL DO  Phone: [phone]  Fax: [phone]  Follow Up Time: 1 week    Ed Babb  Phone: (   )    -  Fax: (   )    -  Follow Up Time: 1 week   PANCHO HALL DO  Phone: [phone]  Fax: [phone]  Follow Up Time: 1 week    Ed Babb  Phone: (   )    -  Fax: (   )    -  Follow Up Time: 1 week   PANCHO HALL DO  Phone: [phone]  Fax: [phone]  Follow Up Time: 1 week    Ed Babb  Phone: (   )    -  Fax: (   )    -  Follow Up Time: 1 week

## 2023-11-21 NOTE — DISCHARGE NOTE NURSING/CASE MANAGEMENT/SOCIAL WORK - NSDCVIVACCINE_GEN_ALL_CORE_FT
influenza, injectable, quadrivalent, preservative free; 05-Sep-2019 11:34; Isabel Dorantes (KAYLAN); Securens; 5MM97 (Exp. Date: 30-Jun-2020); IntraMuscular; Deltoid Right.; 0.5 milliLiter(s); VIS (VIS Published: 15-Aug-2019, VIS Presented: 05-Sep-2019);

## 2023-11-21 NOTE — DISCHARGE NOTE PROVIDER - NSDCFUADDAPPT_GEN_ALL_CORE_FT
APPTS ARE READY TO BE MADE: [ x] YES    Best Family or Patient Contact (if needed):    Additional Information about above appointments (if needed):    1: pcp  2: hematology  3:     Other comments or requests:    APPTS ARE READY TO BE MADE: [X] YES    Best Family or Patient Contact (if needed):    Additional Information about above appointments (if needed):    1: pcp 1 week with repeat blood work (cbc to monitor blood counts)  2: hematology 1 week   3:     Other comments or requests:    APPTS ARE READY TO BE MADE: [X] YES    Best Family or Patient Contact (if needed):    Additional Information about above appointments (if needed):    1: pcp 1 week with repeat blood work (cbc to monitor blood counts)  2: hematology 1 week   3:     Other comments or requests:   Patient advised she has not scheduled any follow up appointments at this time and prefers to schedule them on her own.

## 2023-11-21 NOTE — DISCHARGE NOTE PROVIDER - NSDCCPCAREPLAN_GEN_ALL_CORE_FT
PRINCIPAL DISCHARGE DIAGNOSIS  Diagnosis: Sickle cell pain crisis  Assessment and Plan of Treatment: sickle pain crisis s/p IVF, s/p morphine 4q4h iv prn while in ED. no longer requires PCA pump since pain is better.  will need a repeat in CBC in 1 week with pcp  Please follow up with primary care doctor in 1 week and hematology

## 2023-11-21 NOTE — DISCHARGE NOTE PROVIDER - HOSPITAL COURSE
HPI:  38F c hx SS disease, pw 3 days of worsening b/l knee and shoulder pain.    Pt denies fevers, chills, CP, SOB, URI symptoms, GI symptoms, urinary symptoms. Pt states she does not often require hospitalize for her sickle crises, but usually goes to [facility] when she does. Pt states the pain is like her prior crises.      Hospital Course:  38F c hx SS disease, pw sickle pain crisis s/p IVF, s/p morphine 4q4h iv prn while in ED. no longer requires PCA pump since pain is better.  SIRS (systemic inflammatory response syndrome). no localizing symptoms. denies fevers, chills. low threshold for empiric abx, will need a repeat in CBC in 1 week      Important Medication Changes and Reason:  no changes to meds    Active or Pending Issues Requiring Follow-up:  pcp in 1 week to follow cbc and hematology     Advanced Directives:   [ x] Full code  [ ] DNR  [ ] Hospice    Discharge Diagnoses:  Sickle cell crisis        HPI:  38F c hx SS disease, pw 3 days of worsening b/l knee and shoulder pain.    Pt denies fevers, chills, CP, SOB, URI symptoms, GI symptoms, urinary symptoms. Pt states she does not often require hospitalize for her sickle crises, but usually goes to [facility] when she does. Pt states the pain is like her prior crises.      Hospital Course:  38F c hx SS disease, pw sickle pain crisis s/p IVF, s/p morphine 4q4h iv prn while in ED. no longer requires PCA pump since pain is better.  SIRS (systemic inflammatory response syndrome). no localizing symptoms. denies fevers, chills. low threshold for empiric abx, will need a repeat in CBC in 1 week      Important Medication Changes and Reason:  no changes to meds    Active or Pending Issues Requiring Follow-up:  pcp in 1 week to follow cbc and hematology     Advanced Directives:   [ x] Full code  [ ] DNR  [ ] Hospice    Discharge Diagnoses:  Sickle cell crisis        HPI:  38F c hx SS disease, pw 3 days of worsening b/l knee and shoulder pain.    Pt denies fevers, chills, CP, SOB, URI symptoms, GI symptoms, urinary symptoms. Pt states she does not often require hospitalize for her sickle crises, but usually goes to [facility] when she does. Pt states the pain is like her prior crises.      Hospital Course:  38F c hx SS disease, pw sickle pain crisis s/p IVF, s/p morphine 4q4h iv prn while in ED. no longer requires PCA pump since pain is better.  SIRS (systemic inflammatory response syndrome). no localizing symptoms. denies fevers, chills. low threshold for empiric abx, will need a repeat in CBC in 1 week      Important Medication Changes and Reason:  no changes to meds    Active or Pending Issues Requiring Follow-up:  pcp in 1 week to follow cbc and hematology     Advanced Directives:   [ x] Full code  [ ] DNR  [ ] Hospice    Discharge Diagnoses:  Sickle cell crisis        HPI:  38F c hx SS disease, pw 3 days of worsening b/l knee and shoulder pain.    Pt denies fevers, chills, CP, SOB, URI symptoms, GI symptoms, urinary symptoms. Pt states she does not often require hospitalize for her sickle crises, but usually goes to [facility] when she does. Pt states the pain is like her prior crises.      Hospital Course:  38F c hx SS disease, pw sickle pain crisis s/p IVF, s/p morphine 4q4h iv prn while in ED. no longer requires PCA pump since pain is better.  SIRS (systemic inflammatory response syndrome). no localizing symptoms. denies fevers, chills. bcx NGTD, will need a repeat in CBC in 1 week      Important Medication Changes and Reason:  no changes to meds    Active or Pending Issues Requiring Follow-up:  pcp in 1 week to follow cbc and hematology     Advanced Directives:   [ x] Full code  [ ] DNR  [ ] Hospice    Discharge Diagnoses:  Sickle cell pain crisis        HPI:  38F c hx SS disease, pw 3 days of worsening b/l knee and shoulder pain.    Pt denies fevers, chills, CP, SOB, URI symptoms, GI symptoms, urinary symptoms. Pt states she does not often require hospitalize for her sickle crises, but usually goes to [facility] when she does. Pt states the pain is like her prior crises.      Hospital Course:  38F c hx SS disease, pw sickle pain crisis s/p IVF, s/p morphine 4q4h iv prn while in ED. no longer requires PCA pump since pain is better.  SIRS (systemic inflammatory response syndrome). no localizing symptoms. denies fevers, chills. bcx NGTD, will need a repeat in CBC in 1 week      Important Medication Changes and Reason:  no changes to meds    Active or Pending Issues Requiring Follow-up:  pcp in 1 week to follow cbc and hematology     Advanced Directives:   [ x] Full code  [ ] DNR  [ ] Hospice    Discharge Diagnoses:  Sickle cell pain crisis        HPI:  38F c hx SS disease, pw 3 days of worsening b/l knee and shoulder pain.    Pt denies fevers, chills, CP, SOB, URI symptoms, GI symptoms, urinary symptoms. Pt states she does not often require hospitalize for her sickle crises, but usually goes to [facility] when she does. Pt states the pain is like her prior crises.      Hospital Course:  38F c hx SS disease, pw sickle pain crisis s/p IVF, s/p morphine 4q4h iv prn while in ED. no longer requires PCA pump since pain is better.  SIRS (systemic inflammatory response syndrome). no localizing symptoms. denies fevers, chills. bcx NGTD, will need a repeat in CBC in 1 week      Important Medication Changes and Reason:  no changes to meds    Active or Pending Issues Requiring Follow-up:  pcp in 1 week to follow cbc and hematology     Advanced Directives:   [ x] Full code  [ ] DNR  [ ] Hospice    Discharge Diagnoses:  Sickle cell pain crisis

## 2023-11-21 NOTE — DISCHARGE NOTE PROVIDER - ATTENDING DISCHARGE PHYSICAL EXAMINATION:
Patient was seen and examined at bedside. Feels pain has now resolved. Transitioned to PO oxy. Tolerating PO diet well. No difficulties with ambulation. On RA. Denies CP, SOB, abd pain, diarrhea, dysuria. Reports Hgb around 7-8 at baseline. She will schedule f/u 1 week w/ her hematologist and PCP. \  General: NAD  HEENT: NC/AT; EOMI; MMM  Cards: RRR. S1/S2  Resp: CTA b/l; no wheezes  Abd: soft, NT/ND normoactive bowel sounds throughout   Extremities: moves all extremities   Neuro: grossly nonfocal   Psych: A&Ox3 Patient was seen and examined at bedside. Feels pain has now resolved. Transitioned to PO oxy. Tolerating PO diet well. No difficulties with ambulation. On RA. Denies CP, SOB, abd pain, diarrhea, dysuria. Reports Hgb around 7-8 at baseline. She will schedule f/u 1 week w/ her hematologist and PCP for repeat labs to monitor blood counts and white blood cell count.   General: NAD  HEENT: NC/AT; EOMI; MMM  Cards: RRR. S1/S2  Resp: CTA b/l; no wheezes  Abd: soft, NT/ND normoactive bowel sounds throughout   Extremities: moves all extremities   Neuro: grossly nonfocal   Psych: A&Ox3

## 2023-11-25 LAB
CULTURE RESULTS: SIGNIFICANT CHANGE UP
SPECIMEN SOURCE: SIGNIFICANT CHANGE UP

## 2025-02-17 PROBLEM — Z00.00 ENCOUNTER FOR PREVENTIVE HEALTH EXAMINATION: Status: ACTIVE | Noted: 2025-02-17

## 2025-03-17 ENCOUNTER — APPOINTMENT (OUTPATIENT)
Dept: INTERNAL MEDICINE | Facility: CLINIC | Age: 40
End: 2025-03-17
